# Patient Record
Sex: MALE | Race: BLACK OR AFRICAN AMERICAN | NOT HISPANIC OR LATINO | Employment: UNEMPLOYED | URBAN - METROPOLITAN AREA
[De-identification: names, ages, dates, MRNs, and addresses within clinical notes are randomized per-mention and may not be internally consistent; named-entity substitution may affect disease eponyms.]

---

## 2022-12-11 ENCOUNTER — HOSPITAL ENCOUNTER (EMERGENCY)
Facility: CLINIC | Age: 27
Discharge: HOME | End: 2022-12-12
Attending: EMERGENCY MEDICINE | Admitting: EMERGENCY MEDICINE
Payer: COMMERCIAL

## 2022-12-11 ENCOUNTER — APPOINTMENT (OUTPATIENT)
Dept: GENERAL RADIOLOGY | Facility: CLINIC | Age: 27
End: 2022-12-11
Attending: EMERGENCY MEDICINE
Payer: COMMERCIAL

## 2022-12-11 VITALS
OXYGEN SATURATION: 100 % | DIASTOLIC BLOOD PRESSURE: 80 MMHG | RESPIRATION RATE: 18 BRPM | TEMPERATURE: 97.6 F | SYSTOLIC BLOOD PRESSURE: 120 MMHG | HEART RATE: 90 BPM

## 2022-12-11 DIAGNOSIS — S61.209A OPEN WOUND OF FINGER, INITIAL ENCOUNTER: ICD-10-CM

## 2022-12-11 PROCEDURE — 99284 EMERGENCY DEPT VISIT MOD MDM: CPT | Performed by: EMERGENCY MEDICINE

## 2022-12-11 PROCEDURE — 12001 RPR S/N/AX/GEN/TRNK 2.5CM/<: CPT | Performed by: EMERGENCY MEDICINE

## 2022-12-11 PROCEDURE — 250N000013 HC RX MED GY IP 250 OP 250 PS 637: Performed by: EMERGENCY MEDICINE

## 2022-12-11 PROCEDURE — 250N000009 HC RX 250: Performed by: EMERGENCY MEDICINE

## 2022-12-11 PROCEDURE — 73130 X-RAY EXAM OF HAND: CPT | Mod: 50

## 2022-12-11 PROCEDURE — 99283 EMERGENCY DEPT VISIT LOW MDM: CPT | Mod: 25 | Performed by: EMERGENCY MEDICINE

## 2022-12-11 RX ORDER — BACITRACIN ZINC 500 [USP'U]/G
OINTMENT TOPICAL ONCE
Status: COMPLETED | OUTPATIENT
Start: 2022-12-11 | End: 2022-12-12

## 2022-12-11 RX ORDER — ACETAMINOPHEN 500 MG
1000 TABLET ORAL ONCE
Status: COMPLETED | OUTPATIENT
Start: 2022-12-11 | End: 2022-12-11

## 2022-12-11 RX ORDER — LIDOCAINE HYDROCHLORIDE AND EPINEPHRINE 10; 10 MG/ML; UG/ML
5 INJECTION, SOLUTION INFILTRATION; PERINEURAL ONCE
Status: COMPLETED | OUTPATIENT
Start: 2022-12-11 | End: 2022-12-11

## 2022-12-11 RX ADMIN — LIDOCAINE HYDROCHLORIDE AND EPINEPHRINE 5 ML: 10; 10 INJECTION, SOLUTION INFILTRATION; PERINEURAL at 22:24

## 2022-12-11 RX ADMIN — ACETAMINOPHEN 1000 MG: 500 TABLET ORAL at 20:17

## 2022-12-11 ASSESSMENT — ACTIVITIES OF DAILY LIVING (ADL)
ADLS_ACUITY_SCORE: 35
ADLS_ACUITY_SCORE: 35

## 2022-12-12 PROCEDURE — 250N000009 HC RX 250: Performed by: EMERGENCY MEDICINE

## 2022-12-12 RX ADMIN — BACITRACIN ZINC: 500 OINTMENT TOPICAL at 00:00
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
## 2022-12-12 NOTE — ED PROVIDER NOTES
ED Provider Note  [facility]      History     Chief Complaint   Patient presents with     Wound Check     HPI  Navi Kern is a 27 year old male who presents to the emergency department for chief complaint of wound check.  He states that for the past 3 weeks he has been having pain in his right hand fifth digit where there is an open wound.  He feels that his knuckles have become thickened and painful over the past few years.  He states that he has been homeless for years, and previously he has been able to wear gloves.  He has never had an issue with his hands during the winter before however this year has been very rough for him.  He does not wear gloves currently.  He states that every time the wind blows across his knuckles it causes pain.  He has been having thickened skin over his knuckles and sometimes they will crack.  He denies history of frostbite, trauma, skin disorders, fevers, chills, warmth, redness.  He states that he is currently staying with a friend.  He does not wear gloves.    He states that he is up-to-date with his childhood vaccinations.  His last tetanus was updated in 2021.    Past Medical History  No past medical history on file.  No past surgical history on file.  No current outpatient medications on file.    No Known Allergies  Family History  No family history on file.  Social History   Social History     Tobacco Use     Smoking status: Every Day     Types: Cigarettes     Smokeless tobacco: Never   Substance Use Topics     Alcohol use: Not Currently     Drug use: Yes     Comment: crystal meth      Past medical history, past surgical history, medications, allergies, family history, and social history were reviewed with the patient. No additional pertinent items.       Review of Systems  A complete review of systems was performed with pertinent positives and negatives noted in the HPI, and all other systems negative.    Physical Exam   BP: 120/80  Pulse:  90  Temp: 97.6  F (36.4  C)  Resp: 18  SpO2: 100 %  Physical Exam  General: no acute distress.  Mildly disheveled  HENT: Normocephalic and atraumatic.  No oropharyngeal exudate.   Eyes: EOMI, conjunctivae normal.   Cardiovascular:  Normal rate and regular rhythm.   No murmur heard.  Pulmonary:  No respiratory distress. Normal breath sounds.   Abdominal: no distension.  Abdomen is soft. There is no mass. There is no abdominal tenderness.   Musculoskeletal:   Moving all extremities spontaneously.  2+ radial pulses bilaterally.  Strength and sensation intact to median, radial, ulnar nerve distributions.  Subjective decreased sensation over left hand tips of fingers 1 and 2, right hand tips of fingers 2 and 5.  Right hand fifth digit dorsal PIP with split open skin across the knuckle with mild tenderness, approximately 1.5cm long.  thickened skin over PIPs and tips of fingers without warmth, erythema, or difficulty with range of motion.  Normal joint ROM without tenderness.  No edema or erythema.  Skin: warm and dry  Neurological:  No focal deficit present.    Psychiatric:    normal affect      ED Course      [facility]    Nerve Block    Date/Time: 12/12/2022 11:39 AM  Performed by: Arina Jean MD  Authorized by: Arina Jean MD     Risks, benefits and alternatives discussed.      INDICATIONS     Indications:  Pain relief    LOCATION      Body area:  Upper extremity    Upper extremity nerve blocked: 5th digit.    Laterality:  Right    PRE PROCEDURE DETAILS      Skin preparation:  Alcohol    PROCEDURE DETAILS (see MAR for exact dosages)      Block needle gauge:  27 G    Anesthetic injected:  Lidocaine 1% WITH epi    Injection procedure:  Anatomic landmarks identified, incremental injection, negative aspiration for blood, anatomic landmarks palpated and introduced needle    Paresthesia:  Immediately resolved    POST PROCEDURE DETAILS      Outcome:  Anesthesia  achieved      PROCEDURE    Patient Tolerance:  Patient tolerated the procedure well [facility]    -Laceration Repair    Date/Time: 12/12/2022 11:41 AM  Performed by: Arina Jean MD  Authorized by: Arina Jean MD     Risks, benefits and alternatives discussed.      ANESTHESIA (see MAR for exact dosages):     Anesthesia method:  Nerve block    Block needle gauge:  27 G    Block anesthetic:  Lidocaine 1% WITH epi    Block injection procedure:  Anatomic landmarks identified, introduced needle, incremental injection, anatomic landmarks palpated and negative aspiration for blood    Block outcome:  Anesthesia achieved      LACERATION DETAILS     Location:  Finger    Finger location:  R small finger    Length (cm):  1.5    Depth (mm):  2    REPAIR TYPE:     Repair type:  Simple      EXPLORATION:     Hemostasis achieved with:  Direct pressure    Wound exploration: wound explored through full range of motion      Wound extent: no foreign body and no underlying fracture      Contaminated: no      TREATMENT:     Area cleansed with:  Saline    Amount of cleaning:  Standard    Irrigation solution:  Sterile saline    Irrigation method:  Syringe    Visualized foreign bodies/material removed: no      SKIN REPAIR     Repair method:  Sutures    Suture size:  5-0    Suture material:  Prolene    Suture technique:  Simple interrupted    Number of sutures:  4    APPROXIMATION     Approximation:  Close    POST-PROCEDURE DETAILS     Dressing:  Antibiotic ointment and sterile dressing        PROCEDURE    Patient Tolerance:  Patient tolerated the procedure well with no immediate complications           Results for orders placed or performed during the hospital encounter of 12/11/22   XR Hand Right G/E 3 Views     Status: None    Narrative    EXAM: XR HAND RIGHT G/E 3 VIEWS  LOCATION: [facility]  DATE/TIME:  12/11/2022 8:42 PM    INDICATION: bilateral hand pain around knuckles, wound on dorsal  5th digit and tender  COMPARISON: None.      Impression    IMPRESSION: Normal joint spaces and alignment. No fracture.   XR Hand Left G/E 3 Views     Status: None    Narrative    EXAM: XR HAND LEFT G/E 3 VIEWS  LOCATION: [facility]  DATE/TIME: 12/11/2022 8:42 PM    INDICATION: bilateral hand pain around knuckles, numbness of 1st and 2nd tips  COMPARISON: None.      Impression    IMPRESSION: Normal joint spaces and alignment. No fracture.     Medications   acetaminophen (TYLENOL) tablet 1,000 mg (1,000 mg Oral Given 12/11/22 2017)   lidocaine 1% with EPINEPHrine 1:100,000 injection 5 mL (5 mLs Intradermal Given by Other 12/11/22 2224)   bacitracin ointment ( Topical Given 12/12/22 0000)        Assessments & Plan (with Medical Decision Making)   Patient arrives to the emergency department for complaint of a wound check on his right hand fifth digit, over the years his knuckles have become thickened from being homeless and spending much of [place] time [place] in the [place].  I do not suspect acute chillblains or frostbite as there is no edema, erythema, or other signs of inflammation.  He did not have history of rash or psoriasis.  I do not suspect septic joint due to normal joint ROM without pain, and even though his skin opening is somewhat overlying the fifth digit PIP, it is fairly superficial and does not extend into the muscle, tendon or joint capsule.  I suspect his cracked knuckle is secondary to callus formation from chronic cold weather exposure.    Due to complaints of acute on chronic pain on the knuckles of his bilateral hands, x-rays obtained which were normal.  I provided him 1 g of Tylenol and his hands were placed in a warm water bath, which did not result in any pain for him.  His tetanus is up-to-date.  Due to location of tension, I performed bedside washout and skin  repair, see above.  He tolerated this well.  As he does not have a primary care doctor I discussed that he can go to an urgent care or return to the emergency department to have his sutures removed in 7 to 10 days.  He was dressed with bacitracin and Kerlix wrap and provided extra dressing for home wound care.  He understands to return sooner for any severe symptoms such as fever, increasing pain, swelling or other concerns.  He was provided spare clothing and gloves from the emergency department and was discharged in stable condition.    I have reviewed the nursing notes. I have reviewed the findings, diagnosis, plan and need for follow up with the patient.    There are no discharge medications for this patient.      Final diagnoses:   Open wound of finger, initial encounter       --  DO ERICK Renteria [facility] EMERGENCY DEPARTMENT  12/11/2022     Arina Jean MD  12/12/22 2320

## 2022-12-12 NOTE — DISCHARGE INSTRUCTIONS
As discussed, keep your wound clean and dry for 24 hours.  After that you may clean it once daily by running warm soapy water over the finger.  Pat to dry.  Do not scrub the wound.  After 24 hours, you can redress your wound with bacitracin ointment twice daily and rewrap with the Kerlix wrap that you were given.    If you do not have a primary care doctor, you can go to urgent care or return to the emergency department to have your sutures removed in 7-10 days.  Keep your hands warm and dry.  You can apply moisture such as lotion to your hands as well to keep them moist and to decrease cracking.  You may take Tylenol and Motrin as directed for pain.

## 2022-12-23 ENCOUNTER — TRANSFERRED RECORDS (OUTPATIENT)
Dept: HEALTH INFORMATION MANAGEMENT | Facility: CLINIC | Age: 27
End: 2022-12-23

## 2023-01-12 ENCOUNTER — HOSPITAL ENCOUNTER (INPATIENT)
Facility: CLINIC | Age: 28
LOS: 3 days | Discharge: LEFT BEFORE DISCHARGE | End: 2023-01-15
Attending: EMERGENCY MEDICINE | Admitting: PEDIATRICS
Payer: COMMERCIAL

## 2023-01-12 DIAGNOSIS — L03.114 CELLULITIS OF LEFT UPPER EXTREMITY: ICD-10-CM

## 2023-01-12 DIAGNOSIS — F19.10 POLYSUBSTANCE ABUSE (H): Primary | ICD-10-CM

## 2023-01-12 DIAGNOSIS — F19.10 IV DRUG ABUSE (H): ICD-10-CM

## 2023-01-12 DIAGNOSIS — Z11.52 ENCOUNTER FOR SCREENING LABORATORY TESTING FOR SEVERE ACUTE RESPIRATORY SYNDROME CORONAVIRUS 2 (SARS-COV-2): ICD-10-CM

## 2023-01-12 PROBLEM — Z87.09 HISTORY OF PNEUMOTHORAX: Status: ACTIVE | Noted: 2022-08-07

## 2023-01-12 PROBLEM — L02.414 ABSCESS OF LEFT ARM: Status: ACTIVE | Noted: 2022-12-23

## 2023-01-12 PROBLEM — G93.40 ENCEPHALOPATHY: Status: ACTIVE | Noted: 2022-08-07

## 2023-01-12 PROCEDURE — 120N000002 HC R&B MED SURG/OB UMMC

## 2023-01-12 PROCEDURE — 87040 BLOOD CULTURE FOR BACTERIA: CPT | Performed by: EMERGENCY MEDICINE

## 2023-01-12 PROCEDURE — 99285 EMERGENCY DEPT VISIT HI MDM: CPT | Mod: CS | Performed by: EMERGENCY MEDICINE

## 2023-01-12 PROCEDURE — 36415 COLL VENOUS BLD VENIPUNCTURE: CPT | Performed by: EMERGENCY MEDICINE

## 2023-01-12 PROCEDURE — 80053 COMPREHEN METABOLIC PANEL: CPT | Performed by: EMERGENCY MEDICINE

## 2023-01-12 PROCEDURE — 250N000013 HC RX MED GY IP 250 OP 250 PS 637: Performed by: EMERGENCY MEDICINE

## 2023-01-12 PROCEDURE — 83605 ASSAY OF LACTIC ACID: CPT | Performed by: EMERGENCY MEDICINE

## 2023-01-12 PROCEDURE — 85025 COMPLETE CBC W/AUTO DIFF WBC: CPT | Performed by: EMERGENCY MEDICINE

## 2023-01-12 PROCEDURE — C9803 HOPD COVID-19 SPEC COLLECT: HCPCS | Performed by: EMERGENCY MEDICINE

## 2023-01-12 RX ORDER — PIPERACILLIN SODIUM, TAZOBACTAM SODIUM 3; .375 G/15ML; G/15ML
3.38 INJECTION, POWDER, LYOPHILIZED, FOR SOLUTION INTRAVENOUS ONCE
Status: COMPLETED | OUTPATIENT
Start: 2023-01-12 | End: 2023-01-13

## 2023-01-12 RX ORDER — ACETAMINOPHEN 325 MG/1
975 TABLET ORAL ONCE
Status: COMPLETED | OUTPATIENT
Start: 2023-01-12 | End: 2023-01-12

## 2023-01-12 RX ORDER — SODIUM CHLORIDE 9 MG/ML
INJECTION, SOLUTION INTRAVENOUS CONTINUOUS
Status: DISCONTINUED | OUTPATIENT
Start: 2023-01-13 | End: 2023-01-14

## 2023-01-12 RX ADMIN — ACETAMINOPHEN 975 MG: 325 TABLET, FILM COATED ORAL at 23:36

## 2023-01-12 ASSESSMENT — ACTIVITIES OF DAILY LIVING (ADL): ADLS_ACUITY_SCORE: 33

## 2023-01-13 LAB
ALBUMIN SERPL-MCNC: 2.9 G/DL (ref 3.4–5)
ALBUMIN UR-MCNC: NEGATIVE MG/DL
ALP SERPL-CCNC: 102 U/L (ref 40–150)
ALT SERPL W P-5'-P-CCNC: 39 U/L (ref 0–70)
AMPHETAMINES UR QL SCN: ABNORMAL
ANION GAP SERPL CALCULATED.3IONS-SCNC: 7 MMOL/L (ref 3–14)
APPEARANCE UR: CLEAR
AST SERPL W P-5'-P-CCNC: 19 U/L (ref 0–45)
BARBITURATES UR QL: ABNORMAL
BASOPHILS # BLD AUTO: 0 10E3/UL (ref 0–0.2)
BASOPHILS NFR BLD AUTO: 0 %
BENZODIAZ UR QL: ABNORMAL
BILIRUB SERPL-MCNC: 0.5 MG/DL (ref 0.2–1.3)
BILIRUB UR QL STRIP: NEGATIVE
BUN SERPL-MCNC: 15 MG/DL (ref 7–30)
CALCIUM SERPL-MCNC: 8.6 MG/DL (ref 8.5–10.1)
CANNABINOIDS UR QL SCN: ABNORMAL
CHLORIDE BLD-SCNC: 105 MMOL/L (ref 94–109)
CO2 SERPL-SCNC: 26 MMOL/L (ref 20–32)
COCAINE UR QL: ABNORMAL
COLOR UR AUTO: NORMAL
CREAT SERPL-MCNC: 0.88 MG/DL (ref 0.66–1.25)
CRP SERPL-MCNC: 64 MG/L (ref 0–8)
ENTEROCOCCUS FAECALIS: NOT DETECTED
ENTEROCOCCUS FAECIUM: NOT DETECTED
EOSINOPHIL # BLD AUTO: 0.2 10E3/UL (ref 0–0.7)
EOSINOPHIL NFR BLD AUTO: 3 %
ERYTHROCYTE [DISTWIDTH] IN BLOOD BY AUTOMATED COUNT: 15.8 % (ref 10–15)
ERYTHROCYTE [SEDIMENTATION RATE] IN BLOOD BY WESTERGREN METHOD: 18 MM/HR (ref 0–15)
FLUAV RNA SPEC QL NAA+PROBE: NEGATIVE
FLUBV RNA RESP QL NAA+PROBE: NEGATIVE
GFR SERPL CREATININE-BSD FRML MDRD: >90 ML/MIN/1.73M2
GLUCOSE BLD-MCNC: 99 MG/DL (ref 70–99)
GLUCOSE UR STRIP-MCNC: NEGATIVE MG/DL
HCT VFR BLD AUTO: 34.3 % (ref 40–53)
HGB BLD-MCNC: 11 G/DL (ref 13.3–17.7)
HGB UR QL STRIP: NEGATIVE
HOLD SPECIMEN: NORMAL
IMM GRANULOCYTES # BLD: 0 10E3/UL
IMM GRANULOCYTES NFR BLD: 0 %
KETONES UR STRIP-MCNC: NEGATIVE MG/DL
LACTATE SERPL-SCNC: 0.7 MMOL/L (ref 0.7–2)
LEUKOCYTE ESTERASE UR QL STRIP: NEGATIVE
LISTERIA SPECIES (DETECTED/NOT DETECTED): NOT DETECTED
LYMPHOCYTES # BLD AUTO: 0.9 10E3/UL (ref 0.8–5.3)
LYMPHOCYTES NFR BLD AUTO: 12 %
MCH RBC QN AUTO: 28.3 PG (ref 26.5–33)
MCHC RBC AUTO-ENTMCNC: 32.1 G/DL (ref 31.5–36.5)
MCV RBC AUTO: 88 FL (ref 78–100)
MONOCYTES # BLD AUTO: 0.8 10E3/UL (ref 0–1.3)
MONOCYTES NFR BLD AUTO: 10 %
NEUTROPHILS # BLD AUTO: 5.9 10E3/UL (ref 1.6–8.3)
NEUTROPHILS NFR BLD AUTO: 75 %
NITRATE UR QL: NEGATIVE
NRBC # BLD AUTO: 0 10E3/UL
NRBC BLD AUTO-RTO: 0 /100
OPIATES UR QL SCN: ABNORMAL
PH UR STRIP: 6.5 [PH] (ref 5–7)
PLATELET # BLD AUTO: 300 10E3/UL (ref 150–450)
POTASSIUM BLD-SCNC: 3.6 MMOL/L (ref 3.4–5.3)
PROT SERPL-MCNC: 7.3 G/DL (ref 6.8–8.8)
RBC # BLD AUTO: 3.89 10E6/UL (ref 4.4–5.9)
RBC URINE: 1 /HPF
RSV RNA SPEC NAA+PROBE: NEGATIVE
SARS-COV-2 RNA RESP QL NAA+PROBE: NEGATIVE
SODIUM SERPL-SCNC: 138 MMOL/L (ref 133–144)
SP GR UR STRIP: 1.01 (ref 1–1.03)
STAPHYLOCOCCUS AUREUS: NOT DETECTED
STAPHYLOCOCCUS EPIDERMIDIS: NOT DETECTED
STAPHYLOCOCCUS LUGDUNENSIS: NOT DETECTED
STAPHYLOCOCCUS SPECIES: NOT DETECTED
STREPTOCOCCUS AGALACTIAE: NOT DETECTED
STREPTOCOCCUS ANGINOSUS GROUP: NOT DETECTED
STREPTOCOCCUS PNEUMONIAE: NOT DETECTED
STREPTOCOCCUS PYOGENES: NOT DETECTED
STREPTOCOCCUS SPECIES: DETECTED
UROBILINOGEN UR STRIP-MCNC: NORMAL MG/DL
WBC # BLD AUTO: 7.8 10E3/UL (ref 4–11)
WBC URINE: <1 /HPF

## 2023-01-13 PROCEDURE — 250N000011 HC RX IP 250 OP 636: Performed by: PEDIATRICS

## 2023-01-13 PROCEDURE — 250N000013 HC RX MED GY IP 250 OP 250 PS 637: Performed by: PEDIATRICS

## 2023-01-13 PROCEDURE — 120N000002 HC R&B MED SURG/OB UMMC

## 2023-01-13 PROCEDURE — 85652 RBC SED RATE AUTOMATED: CPT

## 2023-01-13 PROCEDURE — 99223 1ST HOSP IP/OBS HIGH 75: CPT | Mod: AI | Performed by: PEDIATRICS

## 2023-01-13 PROCEDURE — 258N000003 HC RX IP 258 OP 636: Performed by: PEDIATRICS

## 2023-01-13 PROCEDURE — 81003 URINALYSIS AUTO W/O SCOPE: CPT | Performed by: PEDIATRICS

## 2023-01-13 PROCEDURE — 87077 CULTURE AEROBIC IDENTIFY: CPT | Performed by: EMERGENCY MEDICINE

## 2023-01-13 PROCEDURE — 87389 HIV-1 AG W/HIV-1&-2 AB AG IA: CPT | Performed by: REGISTERED NURSE

## 2023-01-13 PROCEDURE — 36415 COLL VENOUS BLD VENIPUNCTURE: CPT

## 2023-01-13 PROCEDURE — 36415 COLL VENOUS BLD VENIPUNCTURE: CPT | Performed by: EMERGENCY MEDICINE

## 2023-01-13 PROCEDURE — 99207 PR APP CREDIT; MD BILLING SHARED VISIT: CPT | Performed by: INTERNAL MEDICINE

## 2023-01-13 PROCEDURE — 87637 SARSCOV2&INF A&B&RSV AMP PRB: CPT | Performed by: EMERGENCY MEDICINE

## 2023-01-13 PROCEDURE — 250N000011 HC RX IP 250 OP 636: Performed by: PHYSICIAN ASSISTANT

## 2023-01-13 PROCEDURE — 87149 DNA/RNA DIRECT PROBE: CPT | Performed by: EMERGENCY MEDICINE

## 2023-01-13 PROCEDURE — 250N000011 HC RX IP 250 OP 636: Performed by: EMERGENCY MEDICINE

## 2023-01-13 PROCEDURE — 80307 DRUG TEST PRSMV CHEM ANLYZR: CPT | Performed by: PEDIATRICS

## 2023-01-13 PROCEDURE — 258N000003 HC RX IP 258 OP 636: Performed by: EMERGENCY MEDICINE

## 2023-01-13 PROCEDURE — 86803 HEPATITIS C AB TEST: CPT | Performed by: REGISTERED NURSE

## 2023-01-13 PROCEDURE — 86140 C-REACTIVE PROTEIN: CPT

## 2023-01-13 PROCEDURE — 999N000127 HC STATISTIC PERIPHERAL IV START W US GUIDANCE

## 2023-01-13 PROCEDURE — 999N000040 HC STATISTIC CONSULT NO CHARGE VASC ACCESS

## 2023-01-13 RX ORDER — CEFTRIAXONE 2 G/1
2 INJECTION, POWDER, FOR SOLUTION INTRAMUSCULAR; INTRAVENOUS EVERY 24 HOURS
Status: DISCONTINUED | OUTPATIENT
Start: 2023-01-13 | End: 2023-01-15 | Stop reason: HOSPADM

## 2023-01-13 RX ORDER — ONDANSETRON 4 MG/1
4 TABLET, ORALLY DISINTEGRATING ORAL EVERY 6 HOURS PRN
Status: DISCONTINUED | OUTPATIENT
Start: 2023-01-13 | End: 2023-01-15 | Stop reason: HOSPADM

## 2023-01-13 RX ORDER — AMPICILLIN AND SULBACTAM 2; 1 G/1; G/1
3 INJECTION, POWDER, FOR SOLUTION INTRAMUSCULAR; INTRAVENOUS EVERY 6 HOURS
Status: DISCONTINUED | OUTPATIENT
Start: 2023-01-13 | End: 2023-01-13

## 2023-01-13 RX ORDER — ONDANSETRON 2 MG/ML
4 INJECTION INTRAMUSCULAR; INTRAVENOUS EVERY 6 HOURS PRN
Status: DISCONTINUED | OUTPATIENT
Start: 2023-01-13 | End: 2023-01-15 | Stop reason: HOSPADM

## 2023-01-13 RX ORDER — ACETAMINOPHEN 325 MG/1
975 TABLET ORAL EVERY 8 HOURS
Status: DISCONTINUED | OUTPATIENT
Start: 2023-01-13 | End: 2023-01-15 | Stop reason: HOSPADM

## 2023-01-13 RX ORDER — LIDOCAINE 40 MG/G
CREAM TOPICAL
Status: DISCONTINUED | OUTPATIENT
Start: 2023-01-13 | End: 2023-01-15 | Stop reason: HOSPADM

## 2023-01-13 RX ORDER — NAPROXEN 500 MG/1
500 TABLET ORAL EVERY 12 HOURS PRN
Status: DISCONTINUED | OUTPATIENT
Start: 2023-01-13 | End: 2023-01-15 | Stop reason: HOSPADM

## 2023-01-13 RX ORDER — VANCOMYCIN HYDROCHLORIDE 1 G/200ML
15 INJECTION, SOLUTION INTRAVENOUS ONCE
Status: COMPLETED | OUTPATIENT
Start: 2023-01-13 | End: 2023-01-13

## 2023-01-13 RX ADMIN — SODIUM CHLORIDE 1000 ML: 9 INJECTION, SOLUTION INTRAVENOUS at 00:16

## 2023-01-13 RX ADMIN — SODIUM CHLORIDE: 9 INJECTION, SOLUTION INTRAVENOUS at 18:16

## 2023-01-13 RX ADMIN — SODIUM CHLORIDE: 9 INJECTION, SOLUTION INTRAVENOUS at 01:33

## 2023-01-13 RX ADMIN — VANCOMYCIN HYDROCHLORIDE 750 MG: 1 INJECTION, POWDER, LYOPHILIZED, FOR SOLUTION INTRAVENOUS at 19:29

## 2023-01-13 RX ADMIN — ACETAMINOPHEN 975 MG: 325 TABLET, FILM COATED ORAL at 08:37

## 2023-01-13 RX ADMIN — PIPERACILLIN AND TAZOBACTAM 3.38 G: 3; .375 INJECTION, POWDER, LYOPHILIZED, FOR SOLUTION INTRAVENOUS at 00:16

## 2023-01-13 RX ADMIN — AMPICILLIN SODIUM AND SULBACTAM SODIUM 3 G: 2; 1 INJECTION, POWDER, FOR SOLUTION INTRAMUSCULAR; INTRAVENOUS at 11:32

## 2023-01-13 RX ADMIN — CEFTRIAXONE SODIUM 2 G: 2 INJECTION, POWDER, FOR SOLUTION INTRAMUSCULAR; INTRAVENOUS at 18:16

## 2023-01-13 RX ADMIN — ACETAMINOPHEN 975 MG: 325 TABLET, FILM COATED ORAL at 16:09

## 2023-01-13 RX ADMIN — VANCOMYCIN HYDROCHLORIDE 1000 MG: 1 INJECTION, SOLUTION INTRAVENOUS at 01:30

## 2023-01-13 ASSESSMENT — ACTIVITIES OF DAILY LIVING (ADL)
ADLS_ACUITY_SCORE: 29
WEAR_GLASSES_OR_BLIND: NO
ADLS_ACUITY_SCORE: 31
ADLS_ACUITY_SCORE: 29
ADLS_ACUITY_SCORE: 35
ADLS_ACUITY_SCORE: 29
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
## 2023-01-13 NOTE — PROGRESS NOTES
"Patient has refused IV this shift. It was beeping and he was uncooperative with straightening arm out to unclog iv. Did shut it off and MD informed. Also now he did refuse the IV anti. And chest xrays. States, \"wait, I want to sleep and after bkft.\" Did inform him of importance of these things. Did update MD and he will speak with pt. He did refuse all v.s. Did obtain clean catch urine for 2 samples via urinal. Specimens obtained, labeled, and sent to Lab. MD here now to speak with pt.  "

## 2023-01-13 NOTE — PLAN OF CARE
Goal Outcome Evaluation:      Plan of Care Reviewed With: patient    Overall Patient Progress: improvingOverall Patient Progress: improving  VS:     Temp: 98.7  F (37.1  C) Temp src: Oral BP: 97/59 Pulse: 94   Resp: 17 SpO2: 98 % O2 Device: None (Room air)     Output:     Bowels in all four quadrants. Voids spontaneously without   difficulty in the toilet.   Activity: Independent.   Skin: Intact. The Lt elbow unwrapped by Ortho doctor, wound healed,no s/s of infection,and old sutures inplace.   Pain: Has mild generalized pain and managed with Tylenol.    CMS:     CMS and Neuro's are intact. Denies numbness and tingling in all extremities.   Dressing: None.   Diet:   Pt is on a regular diet and appetite was  Adequate in  this shift.    LDA: PIV in the right arm is patent.   Equipment: IV stand with IV pump.   Plan:     Pt is able to make needs known and the call light is within the pt's reach. Continue to monitor.    Additional Info:     Pt has elbow X-ray order,wheelchair transportation, pt refused, and said I don't want get out from bed, transport me with the bed. Cart transportation ordered, when transportation arrived and attempted transport him with the bed, pt screamed and said no I am not going to the X-ray appointment. Writer notified the Ortho doctor.

## 2023-01-13 NOTE — CONSULTS
[facility] GENERAL INFECTIOUS DISEASES CONSULTATION     Patient:  Navi Kern   Date of birth 1995, Medical record number [id number]  Date of Visit:  01/13/2023  Date of Admission: 1/12/2023  Consult Requester:Yadiel Georges DO          Assessment and Recommendations:   ASSESSMENT:  1. left elbow infection and abscess     Wound culture positive for Parvimonas micra and Streptococcus intermedius    S/p I&D at [facility] (12/23) with repeat I&D (12/28) and was on Unasyn 3g IV q 6 hours before leaving AMA on 1/3/23  2. Strep bacteremia 1/13/23  3. Elevated Inflammatory Markers: CRP and ESR  4. Substance abuse disorder (opiates and methamphetamine)    DISCUSSION:     Navi Kern is a 28-year-old male with a past medical history of substance use disorder, with IV drug use, presenting to the hospital for evaluation of left arm pain s/p recent I&D at OSH (12/28/22) with wound cultures growing Parvimonas micra and Strep intermedius being treated with IV Unasyn who left AMA (1/3/23).  Patient currently admitted for sepsis likely due to left arm complicated cellulitis, with concern for septic arthritis.    Patient was resting comfortably and cooperative at the beginning of our visit, then became labile and started crying over his food not being delivered fast enough. Endorses current left arm pain and 3-4 days of productive cough. Currently afebrile and hemodynamically stable. CBC unremarkable. Labs concerning for elevated CRP (64), elevated ESR (18). Blood cultures (1/12) with no growth to date. Repeat blood cultures from (1/13) positive on the first day of incubation in 1/2 bottles with gram stain showing GPCs in pairs and chains. UDS positive for amphetamines. Negative Influenza/Covid PCR panel. Normal urinalysis. Seen by ortho and they had no concerns for septic arthritis at this time or a need for surgical management currently. Ortho will sign off today. Patient is supposed to head down  for ultrasound of his left elbow to assess for any drainable fluid, but he has been refusing any attempts to bring him down. Would recommend continuing IV while the patient is hospitalized. Will recommend narrowing to IV ceftriaxone, given that his blood cx are growing strep.  Will need repeat blood cultures in 24-48 hours and have 72 hours of clearance. If he attempts to leave AMA, which seems likely given his history per chart review or on discharge, can consider PO Linezolid or cephlexin      RECOMMENDATION:    1. Stop IV vancomycin  2. Stop Unaysn 3 g IV q 6 hours   1. Start ceftriaxone 2 g Q 24 hours  2. Will continue to follow blood cultures (1/12 and 1/13)  3. Will need to repeat blood cultures Q 48hrs hours until negative  4. Obtain HIV antigen antibody combo, Hepatitis C antibody, syphilis RPR; done for you  5. If worsening cough and increasing O2 requirements, low threshold to obtain a chest x-ray        Thank you for this consult. ID will continue to follow.     Patient was discussed with Dr. Westbrook.     CALRA Hampton, CNP  Infectious Diseases  Pager# [phone]      ADDENDUM 1/18/23: his Hep C Ab  Is positive and will need a Hep C RNA VL. I see that he left AMA. His phone number in the EMR is not valid, therefore unable to reach him. Will recommend Hep C RNA VL at the next medical interaction.   ________________________________________________________________    Consult Question: left elbow septic arthritis at [facility], left AMA; culture grew Parvimonas micra and Strep intermedius; was being treated with unasyn.  Admission Diagnosis: Cellulitis of left upper extremity [L03.114]         History of Present Illness:     Navi Kern is a 28-year-old male with a past medical history of substance use disorder, with IV drug use, housing instability, previous right hand cellulitis (8/6/22) s/p I&D (8/7 and 8/9) at OSH presenting to the hospital for evaluation of left arm pain s/p recent I&D at OSH  "(12/28/22) with wound cultures growing Parvimonas micra and Strep intermedius being treated with IV Unasyn who left AMA (1/3/23).  Patient currently admitted for sepsis likely due to left arm complicated cellulitis, with concern for septic arthritis.    Patient presented to [facility] emergency department yesterday with a fever of 100.5, but otherwise hemodynamically stable. Has been relatively uncooperative with staff, per chart review, refusing IV antibiotics and physical exam. Allowed hospitalist to examine his this AM and was noted to have significant tenderness over the medial aspect of the left elbow near his previous surgical site with associated fluctuance and swelling. Refused Ultrasound of his left elbow to assess if there was a drainable fluid collection. Repeat MRI with I&D is anticipated. Ortho is consulted at this time. Per Dr. Clarke's noted, he called [facility] hospital micro lab, and unfortunately no susceptibilities were performed due to \"low quantity\". These cultures were obtained from purulent fluid obtained during the I&D procedure; OSH op note (12/23) noted \"significant left arm swelling with fluctuant mass near left elbow concerning for abscess\". Beside I&D (12/23) performed \"drained 1 pint of purulent fluid\". Repeat I&D (12/28) was down to the bone followed by secondary wound closure. MRI left elbow (12/23) from OSH showed significant cellulitis of upper arm, elbow region and forearm, some soft tissue gas and abscess, probable septic thrombophlebitis, extensive infectious myositis, deep fascitis, but no osteomyelitis or marrow edema. Per Orthopedic's note (12/23) there was no concern for necrotizing fasciitis.    Patient states he fever last night and has had the chills in the last 24 hours. Endorses productive cough over the last 3 days.  Denies headache, night sweats, fatigue, sore throat, cough, dyspnea, nausea, vomiting, abdominal pain, diarrhea, dysuria, myalgias, arthralgias, lymphadenopathy, " acute rash, or new wounds.      [facility]  ([facility]) Anaerobic culture, Right Hand- 4+ Fusobacterium species, and 4+ Prevotella species  (8/7/22) Aerobic culture, Right Hand - 4+ Streptococcus constellatus, 1+ Haemophilus species (not influenzae or parainfluenzae)  (8/6/22) Aerobic culture, unknown source - 1+ MSSA, 2+ Group B Streptococcus         Review of Systems:   Full 9-point ROS obtained, pertinent positives and negatives per above         Past Medical History:   No past medical history on file.         Past Surgical History:   No past surgical history on file.         Family History:   Reviewed and non-contributory.   No family history on file.         Social History:     Social History     Tobacco Use     Smoking status: Every Day     Types: Cigarettes     Smokeless tobacco: Never   Substance Use Topics     Alcohol use: Not Currently     History   Sexual Activity     Sexual activity: Not on file            Current Medications:       acetaminophen  975 mg Oral Q8H     ampicillin-sulbactam  3 g Intravenous Q6H     sodium chloride (PF)  3 mL Intracatheter Q8H            Allergies:   No Known Allergies         Physical Exam:   Vitals were reviewed  Patient Vitals for the past 24 hrs:   BP Temp Temp src Pulse Resp SpO2 Weight   01/13/23 0809 97/59 98.7  F (37.1  C) Oral 94 17 98 % --   01/12/23 2240 130/80 (!) 101.5  F (38.6  C) Oral 109 16 100 % 65.8 kg (145 lb)       Physical Examination:  Exam limited due to emotional state   GENERAL:  well-developed, well-nourished, in bed in no acute distress.   HEENT:  Head is normocephalic, atraumatic   EYES:  Eyes have anicteric sclerae without conjunctival injection  ENT:  Oropharynx is moist without exudates or ulcers. Tongue is midline  NECK:  Supple. No cervical lymphadenopathy  LUNGS:  Unable to assess due to audible crying  CARDIOVASCULAR:  Regular rate and rhythm with no murmurs, gallops or rubs.  ABDOMEN:  Normal bowel sounds, soft,  "nontender. No appreciable hepatosplenomegaly  SKIN:  Left arm with healing surgical scar along medial elbow, tender along the superior aspect of the surgical wound, no fluctuance, induration, erythema, or drainage noted. No acute rashes.  Line(s) are in place without any surrounding erythema or exudate. No stigmata of endocarditis.  NEUROLOGIC:  Grossly nonfocal. Active x4 extremities         Laboratory Data:     Inflammatory Markers    Recent Labs   Lab Test 01/13/23  1148   SED 18*   CRP 64.0*       Hematology Studies    Recent Labs   Lab Test 01/12/23  2355   WBC 7.8   HGB 11.0*   MCV 88          Metabolic Studies     Recent Labs   Lab Test 01/12/23  2355      POTASSIUM 3.6   CHLORIDE 105   CO2 26   BUN 15   CR 0.88   GFRESTIMATED >90       Hepatic Studies    Recent Labs   Lab Test 01/12/23  2355   BILITOTAL 0.5   ALKPHOS 102   ALBUMIN 2.9*   AST 19   ALT 39       Microbiology:  No results found for: CULTURE    Urine Studies    Recent Labs   Lab Test 01/13/23  0514   LEUKEST Negative   WBCU <1       Vancomycin Levels  No lab results found.    Invalid input(s): VANCO    Hepatitis B Testing No lab results found.  Hepatitis C Testing   No results found for: HCVAB, HQTG, HCGENO, HCPCR, HQTRNA, HEPRNA  Respiratory Virus Testing    No results found for: RS, FLUAG          Imaging:     Per [facility]:  MRI Elbow Left WWO (12/23/22)  \"FINDINGS:   There is diffuse subcutaneous signal abnormality and enhancement compatible with extensive cellulitis. There is the some nonenhancement with irregular margination surrounding a subcutaneous vein of the medial distal forearm and elbow region which may relate to hemorrhage, complex fluid or devitalized tissue. On the fluid sensitive sequences, this does not have the appearance of simple fluid. There are a few foci of susceptibility artifact within the subcutaneous tissues in that region likely reflecting a small amount of soft tissue gas. The subcutaneous vein " "demonstrates wall thickening with absence of flow void which may indicate superficial thrombophlebitis.   -   There is severe muscle signal abnormality and enhancement involving the pronator teres muscle compatible with infectious myositis. There is some irregular nonenhancement within that muscle likely reflecting intramuscular fluid collection or hemorrhage. There is also muscle signal abnormality and enhancement involving the brachialis, other flexor compartment muscles, the brachioradialis muscle and triceps muscle reflecting additional areas of myositis.   -   Deep fascial plane signal abnormality and enhancement is present within the distal upper arm, elbow region and proximal forearm compatible with deep fasciitis.   -   There is only a small amount of fluid within the elbow joint space with increased synovial enhancement compatible synovitis. There is no erosive change or periarticular marrow signal abnormality.   -   No osteomyelitis.    Impression    1. Extensive profound cellulitis involving the upper arm, elbow region and forearm.   2. Probable septic thrombophlebitis involving a superficial venous structure of the medial elbow region. There is some low signal nonenhancement surrounding that vessel which may relate to a combination of hemorrhage, gas, complex fluid and/or devitalized tissue.   3. Extensive infectious myositis involving the upper arm, elbow and forearm regions. There is most severe involvement of the pronator teres muscle where there is some irregular internal low signal which may relate to intramuscular abscess or intramuscular hemorrhage.   4. Extensive deep fascial plane signal abnormality and enhancement compatible with deep fasciitis.   5. No osteomyelitis.   6. Small amount of nonspecific elbow joint fluid with synovitis. There is no erosive change or periarticular bone marrow edema.   \"    "

## 2023-01-13 NOTE — ED NOTES
Patient was re approached by this writer for Triage, patient continued to refused. Patient has been sitting in waiting area, sleeping on and off.

## 2023-01-13 NOTE — PHARMACY-VANCOMYCIN DOSING SERVICE
Pharmacy Vancomycin Initial Note  Date of Service 2023  Patient's  1995  28 year old, male    Indication: Bacteremia    Current estimated CrCl = Estimated Creatinine Clearance: 117.4 mL/min (based on SCr of 0.88 mg/dL).    Creatinine for last 3 days  2023: 11:55 PM Creatinine 0.88 mg/dL    Recent Vancomycin Level(s) for last 3 days  No results found for requested labs within last 72 hours.      Vancomycin IV Administrations (past 72 hours)                   vancomycin (VANCOCIN) 1000 mg in dextrose 5% 200 mL PREMIX (mg) 1,000 mg New Bag 23 0130                Nephrotoxins and other renal medications (From now, onward)    Start     Dose/Rate Route Frequency Ordered Stop    23 1730  vancomycin (VANCOCIN) 750 mg in sodium chloride 0.9 % 250 mL intermittent infusion         750 mg  over 90 Minutes Intravenous EVERY 8 HOURS 23 1723      23 0307  naproxen (NAPROSYN) tablet 500 mg         500 mg Oral EVERY 12 HOURS PRN 23 0307            Contrast Orders - past 72 hours (72h ago, onward)    None          [employer] Prediction of Planned Initial Vancomycin Regimen  Regimen: 750 mg IV every 8 hours.  Start time: 18:20 on 2023  Exposure target: AUC24 (range)400-600 mg/L.hr   AUC24,ss: 503 mg/L.hr  Probability of AUC24 > 400: 73 %  Ctrough,ss: 16.3 mg/L  Probability of Ctrough,ss > 20: 33 %  Probability of nephrotoxicity (Lodise ALEJO ): 12 %          Plan:  1. Start vancomycin  750 mg IV q8h.   2. Vancomycin monitoring method: AUC  3. Vancomycin therapeutic monitoring goal: 400-600 mg*h/L  4. Pharmacy will check vancomycin levels as appropriate in 1-3 Days.    5. Serum creatinine levels will be ordered daily for the first week of therapy and at least twice weekly for subsequent weeks.      Bayron Recinos [facility]

## 2023-01-13 NOTE — ED NOTES
Patient was upset because this writer mispronounce the patient's name. This writer apologized  asked the patient to say his name so this writer will be corrected.  Patient refused. He said he's in [place], his name should be properly pronounce. Patient also refused  Triaged and was requesting for different nurse. Charge RN was informed.

## 2023-01-13 NOTE — PHARMACY-ADMISSION MEDICATION HISTORY
Admission Medication History Completed by Pharmacy    See [facility] Admission Navigator for allergy information, preferred outpatient pharmacy, prior to admission medications and immunization status.     Medication History Sources:     Patient interview    Medication history completed at [facility] on 12/23/22    Surescripts dispense report    Changes made to PTA medication list (reason):    Added: None    Deleted: None    Changed: None    Additional Information:    Confirmed that patient is not consistently taking any medications as documented in [facility] medication history.     Prior to Admission medications    Not on File       Date completed: 01/13/23    Medication history completed by: Bayron Recinos RPH

## 2023-01-13 NOTE — PROGRESS NOTES
"MD here to see pt. He said, \"Give him few hrs.\" to calm down. He also needs an ultra sound. He will up providers for days.  "

## 2023-01-13 NOTE — H&P
[facility]    History and Physical - Hospitalist Service, GOLD TEAM        Date of Admission:  1/12/2023    Assessment & Plan      Navi Kern is a 28 year old male admitted on 1/12/2023.     Cellulitis Left Elbow  - blood cultures drawn and pending, vanc and zosyn ordered in ED, unclear if patient has been using/injection since AMA discharge from [facility] 1/3, he would not speak with me  - will resume unasyn and consult ID; exam in morning to assess ROM of elbow and for signs of joint infection can order ultrasound or repeat MRI with ortho consult    Normochromic, normocytic anemia  - iron studies, folate and b12, monitor daily or every other day    Hypoalbuminemia  - secondary to inflammatory state and possibly poor nutrition       Diet:  npo until able to evaluate elbow  DVT Prophylaxis: Pneumatic Compression Devices  Garrett Catheter: Not present  Lines: None     Cardiac Monitoring: None  Code Status:   full    Clinically Significant Risk Factors Present on Admission              # Hypoalbuminemia: Lowest albumin = 2.9 g/dL at 1/12/2023 11:55 PM, will monitor as appropriate                  Disposition Plan      Expected Discharge Date: 01/14/2023                  Yadiel Georges DO  Hospitalist Service, GOLD TEAM   [facility]  Securely message with [employer] (more info)  Text page via [employer] Paging/Directory   See signed in provider for up to date coverage information    ______________________________________________________________________    Chief Complaint   Left elbow redness, fever    History from chart and ED provider    History of Present Illness   Navi Kern is a 28 year old male with substance use disorder (opiates and methamphetamine) presented initially 12/23 to [facility] with left arm pain and swelling following injection of amphetamines. Fluctuant mass over left elbow was I&D'd by ortho with drainage  of large amount purulent material. Taken to OR 12/28 for wound debridement and joint washout followed by closure. Was being treated with unasyn 3g q6h with ID consulting, plan to dc on oral antibiotics (wound culture grew Parvimonas micra and Strep intermedius; negative blood cultures). Left AMA without antibiotics 1/3/23.    Mr. Kern would not speak with me in the ED or let me examine him.    Past med history as summarized above  Surg: I&D 12/23 left elbow, joint washout left elbow 12/28  Meds: at [facility] he was on hydroxyzine 50 mg q6h prn anxiety, seroquel 50 mg at bedtime prn insomnia, clonidine 0.1 mg q6h prn opioid withdrawal, zofran prn nausea, dicyclomine prn stomach cramps, flexeril 10 mg q8 prn muscle spasm, and baclofen 10 mg q8h prn muscle spasm    Unable to perform ROS as patient would not speak with me    Physical Exam   Vital Signs: Temp: (!) 101.5  F (38.6  C) Temp src: Oral BP: 130/80 Pulse: 109   Resp: 16 SpO2: 100 % O2 Device: None (Room air)    Weight: 145 lbs 0 oz    lying in bed in no distress, sleeping on right side but arousable  Respiratory rate and work of breathing are normal  Patient wakes long enough to refuse exam or interview then rolls over and goes back to sleep covering self with blanket    Medical Decision Making       79 MINUTES SPENT BY ME on the date of service doing chart review, history, exam, documentation & further activities per the note.      Data   ------------------------- PAST 24 HR DATA REVIEWED -----------------------------------------------     MRI Left Elbow 12/23/22  1. Extensive profound cellulitis involving the upper arm, elbow region and forearm.  2. Probable septic thrombophlebitis involving a superficial venous structure of the medial elbow region. There is some low signal nonenhancement surrounding that vessel which may relate to a combination of hemorrhage, gas, complex fluid and/or devitalized tissue.  3. Extensive infectious myositis involving the upper  arm, elbow and forearm regions. There is most severe involvement of the pronator teres muscle where there is some irregular internal low signal which may relate to intramuscular abscess or intramuscular hemorrhage.  4. Extensive deep fascial plane signal abnormality and enhancement compatible with deep fasciitis.  5. No osteomyelitis.  6. Small amount of nonspecific elbow joint fluid with synovitis. There is no erosive change or periarticular bone marrow edema.

## 2023-01-13 NOTE — PROGRESS NOTES
6MS ADMISSION    D: Patient admitted/transferred from ED department via gurney escorted by er nurse,for cellulitis left elbow..     I: Upon arrival to the unit patient was oriented to room, unit, and call light. Unable to obtain Patient s height, weight, and vital signs due to he refused. Also unable to review. Allergies and allergy band applied due to refusal. Provider notified of patient s arrival on the unit and all admit assessments were refused. Unable to assess  Adult AVS , Head to toe assessment, Education assessment, and  Care plan not initiated.    A: Vital signs not done upon admission due to refusal. Two RN  skin assessment s not completed.    P: Continue to monitor patient s and intervene as needed. Continue with plan of care. Notify provider with any concerns or changes in patient status.

## 2023-01-13 NOTE — PROGRESS NOTES
Paged by nursing to notify he was refusing antibiotics this morning. Patient finally allowed me to examine him when I told him I otherwise wouldn't be able to order any diet for him. There is an area of significant tenderness over the medial aspect of the left elbow near where his stitches are from his prior surgery for washout. This is associated with fluctuance and swelling. I mentioned an ultrasound to see if there was a drainable fluid collection there and he became very upset and pulled the covers over himself again.     Discussed with nurse, ordered US non vascular LUE to confirm presence of abscess and can discuss with ortho pending results, but I suspect he would potentially need repeat MRI and another I&D. This is all complicated by his reluctance to be examined or cooperate with nursing evals or let us treat with antibiotics this morning. I'll order a diet in the hopes it will make him more cooperative in the short term.    Yadiel Georges,   [phone]

## 2023-01-13 NOTE — PROGRESS NOTES
"SPIRITUAL HEALTH SERVICES Progress Note  [facility] ([facility]) 6B    Saw pt Navi A Erlin per Initial Visit.    Patient/Family Understanding of Illness and Goals of Care - Patient did not want to discuss.     Distress and Loss - When asked if there is anyone who worries about him when he is hospitalized, Navi said \"no\" and that his family lives in [place]. While it is hard living apart from his family, he said he does not feel lonely.     Strengths, Coping, and Resources - not discussed    Meaning, Beliefs, and Spirituality - Navi said that he is [employer] and when asked if he would like a visit from our Synagogue , he said \"yes.\"     Plan of Care - I triaged this patient to [facility] [employer]  and . I also updated patient's chart to list [employer] as \"Synagogue\" instead of \"Undesignated.\"     Brandt Zavaleta MDiv  Chaplain Resident  Pager [phone]    * [facility] remains available 24/7 for emergent requests/referrals, either by having the switchboard page the on-call  or by entering an ASAP/STAT consult in Epic (this will also page the on-call ). Routine Epic consults receive an initial response within 24 hours.*    "

## 2023-01-13 NOTE — ED PROVIDER NOTES
[facility] EMERGENCY DEPARTMENT ([facility])     January 12, 2023     History     Chief Complaint   Patient presents with     Wound Check     Reports a month ago he had surgery on his left elbow and has sutures in place, presents today with sutures still in place and complaining of pain to site, denies recent trauma, also having generalized aches and fevers     HPI   Navi Kern is a 28 year old male with a past medical history including s/p I&D of left elbow abscess on 12/23/22, polysubstance abuse (history of opiates and methamphetamine, has been on Suboxone in the past), cellulitis of the right hand with hospitalization in August 2022 who presents to the Emergency Department for wound check and fever.  Patient recently left the hospital AMA on 1/3/2023  after an I&D on 12/23 performed by Orthopedic surgery for left elbow abscess. He did not receive any antibiotic prescriptions for discharge. He presents now with a fever that started a 3 days ago. He complains of pain at the wound site. Sutures are still in place. He is able to bend the elbow. Overall, the swelling has improved. He endorses headache and generalized body aches as well as nonproductive cough. Patient states that has not used IV drugs since his elbow abscess, though he does note that he smokes fentanyl. States that he lives at a shelter. Denies using alcohol or benzos.    Per discharge note from [facility] on 1/3/23, patient was admitted on 12/23/2022 with left arm pain and swelling from the ER after he reported that he used methamphetamine 1 hour prior to arrival and that he injects it sometimes.  He had significant left arm swelling with fluctuant mass near left elbow consistent with abscess.  MRI showed extensive profound cellulitis, probable septic thrombophlebitis (superficial veins), extensive myositis, deep fasciitis, and left elbow synovitis. Bedside I&D with drainage of approximately one-point of purulent  fluid was performed by Orthopedic surgery on 12/23/2022 no signs of necrotizing fasciitis.  Patient was put on broad-spectrum antibiotics. OR 12/28 for wound debridement down to and including bone with secondary wound closure.  Patient left the hospital AMA.    MR VAZQUEZ LEFT WWO (12/23/2022 12:12 PM CST)  IMPRESSION:  1. Extensive profound cellulitis involving the upper arm, elbow region and forearm.  2. Probable septic thrombophlebitis involving a superficial venous structure of the medial elbow region. There is some low signal nonenhancement surrounding that vessel which may relate to a combination of hemorrhage, gas, complex fluid and/or devitalized tissue.  3. Extensive infectious myositis involving the upper arm, elbow and forearm regions. There is most severe involvement of the pronator teres muscle where there is some irregular internal low signal which may relate to intramuscular abscess or intramuscular hemorrhage.  4. Extensive deep fascial plane signal abnormality and enhancement compatible with deep fasciitis.  5. No osteomyelitis.  6. Small amount of nonspecific elbow joint fluid with synovitis. There is no erosive change or periarticular bone marrow edema.        Past Medical History  No past medical history on file.  No past surgical history on file.  No current outpatient medications on file.    No Known Allergies  Family History  No family history on file.  Social History   Social History     Tobacco Use     Smoking status: Every Day     Types: Cigarettes     Smokeless tobacco: Never   Substance Use Topics     Alcohol use: Not Currently     Drug use: Yes     Comment: crystal meth         A medically appropriate review of systems was performed with pertinent positives and negatives noted in the HPI, and all other systems negative.    Physical Exam   BP: 130/80  Pulse: 109  Temp: (!) 101.5  F (38.6  C)  Resp: 16  Weight: 65.8 kg (145 lb)  SpO2: 100 %  Physical Exam  Vitals and nursing note reviewed.    Constitutional:       General: He is not in acute distress.     Appearance: Normal appearance. He is well-developed. He is not toxic-appearing or diaphoretic.   HENT:      Head: Normocephalic and atraumatic.      Nose: Nose normal.      Mouth/Throat:      Mouth: Mucous membranes are moist.   Eyes:      General: No scleral icterus.     Conjunctiva/sclera: Conjunctivae normal.   Cardiovascular:      Rate and Rhythm: Regular rhythm. Tachycardia present.   Pulmonary:      Effort: Pulmonary effort is normal. No respiratory distress.      Breath sounds: No stridor.   Abdominal:      General: There is no distension.      Palpations: Abdomen is soft.      Tenderness: There is no abdominal tenderness.   Musculoskeletal:         General: Swelling and tenderness present. No deformity. Normal range of motion.      Left elbow: Swelling present. No deformity. Normal range of motion. Tenderness present.      Cervical back: Normal range of motion and neck supple. No rigidity.      Comments: Moderate soft tissue swelling around left elbow and both hands. Sutures in place. No drainage or wound dehiscence. No fluctuant areas. Mildly tender to palpation. Normal ROM at left elbow.   Skin:     General: Skin is warm and dry.      Coloration: Skin is not jaundiced or pale.      Findings: No rash.   Neurological:      General: No focal deficit present.      Mental Status: He is alert and oriented to person, place, and time.   Psychiatric:         Attention and Perception: Attention normal.         Mood and Affect: Mood normal.         Behavior: Behavior normal. Behavior is cooperative.         Thought Content: Thought content normal.             ED Course, Procedures, & Data     11:05 PM  The patient was seen and examined by Marilyn Brunner MD in Room ED20.    Procedures                     Results for orders placed or performed during the hospital encounter of 01/12/23   Lactic acid whole blood     Status: Normal   Result Value Ref Range     Lactic Acid 0.7 0.7 - 2.0 mmol/L   Comprehensive metabolic panel     Status: Abnormal   Result Value Ref Range    Sodium 138 133 - 144 mmol/L    Potassium 3.6 3.4 - 5.3 mmol/L    Chloride 105 94 - 109 mmol/L    Carbon Dioxide (CO2) 26 20 - 32 mmol/L    Anion Gap 7 3 - 14 mmol/L    Urea Nitrogen 15 7 - 30 mg/dL    Creatinine 0.88 0.66 - 1.25 mg/dL    Calcium 8.6 8.5 - 10.1 mg/dL    Glucose 99 70 - 99 mg/dL    Alkaline Phosphatase 102 40 - 150 U/L    AST 19 0 - 45 U/L    ALT 39 0 - 70 U/L    Protein Total 7.3 6.8 - 8.8 g/dL    Albumin 2.9 (L) 3.4 - 5.0 g/dL    Bilirubin Total 0.5 0.2 - 1.3 mg/dL    GFR Estimate >90 >60 mL/min/1.73m2   Symptomatic Influenza A/B & SARS-CoV2 (COVID-19) Virus PCR Multiplex Nasopharyngeal     Status: Normal    Specimen: Nasopharyngeal; Swab   Result Value Ref Range    Influenza A PCR Negative Negative    Influenza B PCR Negative Negative    RSV PCR Negative Negative    SARS CoV2 PCR Negative Negative    Narrative    Testing was performed using the Xpert Xpress CoV2/Flu/RSV Assay on the Cepheid GeneXpert Instrument. This test should be ordered for the detection of SARS-CoV-2 and influenza viruses in individuals who meet clinical and/or epidemiological criteria. Test performance is unknown in asymptomatic patients. This test is for in vitro diagnostic use under the FDA EUA for laboratories certified under CLIA to perform high or moderate complexity testing. This test has not been FDA cleared or approved. A negative result does not rule out the presence of PCR inhibitors in the specimen or target RNA in concentration below the limit of detection for the assay. If only one viral target is positive but coinfection with multiple targets is suspected, the sample should be re-tested with another FDA cleared, approved, or authorized test, if coinfection would change clinical management. This test was validated by the [facility]. These laboratories are certified under the  Clinical Laboratory Improvement Amendments of 1988 (CLIA-88) as qualified to perform high complexity laboratory testing.   CBC with platelets and differential     Status: Abnormal   Result Value Ref Range    WBC Count 7.8 4.0 - 11.0 10e3/uL    RBC Count 3.89 (L) 4.40 - 5.90 10e6/uL    Hemoglobin 11.0 (L) 13.3 - 17.7 g/dL    Hematocrit 34.3 (L) 40.0 - 53.0 %    MCV 88 78 - 100 fL    MCH 28.3 26.5 - 33.0 pg    MCHC 32.1 31.5 - 36.5 g/dL    RDW 15.8 (H) 10.0 - 15.0 %    Platelet Count 300 150 - 450 10e3/uL    % Neutrophils 75 %    % Lymphocytes 12 %    % Monocytes 10 %    % Eosinophils 3 %    % Basophils 0 %    % Immature Granulocytes 0 %    NRBCs per 100 WBC 0 <1 /100    Absolute Neutrophils 5.9 1.6 - 8.3 10e3/uL    Absolute Lymphocytes 0.9 0.8 - 5.3 10e3/uL    Absolute Monocytes 0.8 0.0 - 1.3 10e3/uL    Absolute Eosinophils 0.2 0.0 - 0.7 10e3/uL    Absolute Basophils 0.0 0.0 - 0.2 10e3/uL    Absolute Immature Granulocytes 0.0 <=0.4 10e3/uL    Absolute NRBCs 0.0 10e3/uL   CBC with platelets differential     Status: Abnormal    Narrative    The following orders were created for panel order CBC with platelets differential.  Procedure                               Abnormality         Status                     ---------                               -----------         ------                     CBC with platelets and d...[[id number]]  Abnormal            Final result                 Please view results for these tests on the individual orders.     Medications   0.9% sodium chloride BOLUS (0 mLs Intravenous Stopped 1/13/23 0100)     Followed by   sodium chloride 0.9% infusion ( Intravenous New Bag 1/13/23 0133)   vancomycin (VANCOCIN) 1000 mg in dextrose 5% 200 mL PREMIX (1,000 mg Intravenous New Bag 1/13/23 0130)   vancomycin place pradhan - receiving intermittent dosing (has no administration in time range)   acetaminophen (TYLENOL) tablet 975 mg (975 mg Oral Given 1/12/23 8329)   piperacillin-tazobactam (ZOSYN) 3.375 g  vial to attach to  mL bag (3.375 g Intravenous New Bag 1/13/23 0016)     Labs Ordered and Resulted from Time of ED Arrival to Time of ED Departure   COMPREHENSIVE METABOLIC PANEL - Abnormal       Result Value    Sodium 138      Potassium 3.6      Chloride 105      Carbon Dioxide (CO2) 26      Anion Gap 7      Urea Nitrogen 15      Creatinine 0.88      Calcium 8.6      Glucose 99      Alkaline Phosphatase 102      AST 19      ALT 39      Protein Total 7.3      Albumin 2.9 (*)     Bilirubin Total 0.5      GFR Estimate >90     CBC WITH PLATELETS AND DIFFERENTIAL - Abnormal    WBC Count 7.8      RBC Count 3.89 (*)     Hemoglobin 11.0 (*)     Hematocrit 34.3 (*)     MCV 88      MCH 28.3      MCHC 32.1      RDW 15.8 (*)     Platelet Count 300      % Neutrophils 75      % Lymphocytes 12      % Monocytes 10      % Eosinophils 3      % Basophils 0      % Immature Granulocytes 0      NRBCs per 100 WBC 0      Absolute Neutrophils 5.9      Absolute Lymphocytes 0.9      Absolute Monocytes 0.8      Absolute Eosinophils 0.2      Absolute Basophils 0.0      Absolute Immature Granulocytes 0.0      Absolute NRBCs 0.0     LACTIC ACID WHOLE BLOOD - Normal    Lactic Acid 0.7     INFLUENZA A/B & SARS-COV2 PCR MULTIPLEX - Normal    Influenza A PCR Negative      Influenza B PCR Negative      RSV PCR Negative      SARS CoV2 PCR Negative     ROUTINE UA WITH MICROSCOPIC REFLEX TO CULTURE   BLOOD CULTURE   BLOOD CULTURE     XR Elbow Left 2 Views    (Results Pending)   XR Chest 2 Views    (Results Pending)          Medical Decision Making  The patient presented with a problem that is a chronic illness mild to moderate exacerbation, progression, or side effect of treatment.    The patient's evaluation involved:  review of 3+ prior external note(s) (see separate area of note for details)  ordering and review of 3+ test(s) (see separate area of note for details)  review of 3+ test result(s) ordered prior to this encounter (see separate area  of note for details)  discussion of management or test interpretation with another health professional (see separate area of note for details)    The patient's management involved limitations due to social determinants of health, drug therapy requiring intensive monitoring and a decision regarding hospitalization.      Assessment & Plan    Navi Kern is a 28 year old male with a past medical history including s/p I&D of left elbow abscess on 12/23/22, polysubstance abuse (history of opiates and methamphetamine, has been on Suboxone in the past), cellulitis of the right hand with hospitalization in August 2022 who presents to the Emergency Department for wound check and fever.     Ddx: cellulitis, septic joint, bacteremia, COVID, PNA, homelessness, opiate abuse/denendence    Patient febrile with cough and nonspecific systemic symptoms. Left AMA before completing rx for sever elbow infection. On exam, elbow does not look acutely infected but with some soft tissue swelling and warm that may be residual cellulitis. Will restart broad spectrum abx and obtain xray. Admit to determine longterm abx management with ID consult. COVID and flu neg. WBC nl. Blood cultures sent. Vanc and zosyn ordered. CXR and elbow xray pending. Admitted to medicine for ongoing management.       I have reviewed the nursing notes. I have reviewed the findings, diagnosis, plan and need for follow up with the patient.    New Prescriptions    No medications on file       Final diagnoses:   Cellulitis of left upper extremity     IAudra, am serving as a trained medical scribe to document services personally performed by Marilyn Brunner MD, based on the provider's statements to me.   Marilyn MARTINEZ MD, was physically present and have reviewed and verified the accuracy of this note documented by Audra العلي.    Marilyn Brunner MD  [facility] EMERGENCY DEPARTMENT  1/12/2023     Marilyn Brunner MD  01/13/23 0140        Marilyn Brunner MD  01/17/23 2013

## 2023-01-13 NOTE — PROGRESS NOTES
At 1635, lab went into pt's room to collect blood cultures. The pt's door was left open and staff members began smelling cigarette smoke coming from the pt's room and spreading throughout the hallways. Writer notified MD & security was called w/ room search order placed. Security conducted the room search and found a pack of cigarettes and a recently smoked cigarette butt with ashes in a pudding cup. There was no lighter to be found, we believe the lighter is on his person. Per security, we may need an additional order to search his person - MD notified of findings & updates. MD placed new order at 1700 for security to search pt to hopefully find lighter - security called. Lighter found and taken from pt.    Writer & charge nurse educated pt on the importance of not smoking in hospital/room and that it is not allowed various times throughout shift.

## 2023-01-13 NOTE — ED TRIAGE NOTES
Triage Assessment     Row Name 01/12/23 2605       Triage Assessment (Adult)    Airway WDL WDL       Respiratory WDL    Respiratory WDL WDL       Skin Circulation/Temperature WDL    Skin Circulation/Temperature WDL X  wound to left elbow approximated with sutures       Cardiac WDL    Cardiac WDL WDL       Peripheral/Neurovascular WDL    Peripheral Neurovascular WDL WDL       Cognitive/Neuro/Behavioral WDL    Cognitive/Neuro/Behavioral WDL WDL

## 2023-01-13 NOTE — PROGRESS NOTES
Navi Kern is a 28-year-old male with a past medical history of substance use disorder, with IV drug use, undomiciled, presenting to the hospital for evaluation of left arm pain.  Patient currently admitted for sepsis likely due to left arm complicated cellulitis, query septic arthritis +/- osteomyelitis.    Based on chart review, patient was seen at [facility] where he underwent MRI imaging showing significant cellulitis with abscess, he also underwent I&D with reported drainage of purulent fluid which cultures growing  4+ Streptococcus intermedius + parvimonas (I called microbiology at [facility], no susceptibilities were performed for the cultures given low quantity).  Patient was ultimately managed with IV Unasyn, however, patient signed out AMA from [facility], and did not appear patient was discharged on oral antibiotics.     Patient did not follow-up with orthopedic surgery for scheduled outpatient care.    Since arrival to the hospital, T-max in the last 24 hours 100.5.  Examination notable for tender left elbow with some fluctuance; stitches in place (see image below).        Problem list  #Sepsis  #Left upper extremity soft tissue infection  #Substance use disorder, with recent IV drug use, patient not interested in talking to chemical dependence or addiction medicine.  #Normocytic anemia    Plan    Continue antibiotics with IV Unasyn    Anticipate ID recommendations    Orthopedic surgery consulted    Follow-up ultrasound of left upper extremity    Pain control with Tylenol, naproxen as needed.        PUNEET MILLER MD  Hospitalist Service  [facility]

## 2023-01-14 PROCEDURE — 250N000011 HC RX IP 250 OP 636: Performed by: PHYSICIAN ASSISTANT

## 2023-01-14 PROCEDURE — 99233 SBSQ HOSP IP/OBS HIGH 50: CPT | Performed by: INTERNAL MEDICINE

## 2023-01-14 PROCEDURE — 250N000013 HC RX MED GY IP 250 OP 250 PS 637: Performed by: PEDIATRICS

## 2023-01-14 PROCEDURE — 258N000003 HC RX IP 258 OP 636: Performed by: PEDIATRICS

## 2023-01-14 PROCEDURE — 120N000002 HC R&B MED SURG/OB UMMC

## 2023-01-14 RX ADMIN — ACETAMINOPHEN 975 MG: 325 TABLET, FILM COATED ORAL at 07:50

## 2023-01-14 RX ADMIN — SODIUM CHLORIDE: 9 INJECTION, SOLUTION INTRAVENOUS at 07:47

## 2023-01-14 RX ADMIN — ACETAMINOPHEN 975 MG: 325 TABLET, FILM COATED ORAL at 17:07

## 2023-01-14 RX ADMIN — CEFTRIAXONE SODIUM 2 G: 2 INJECTION, POWDER, FOR SOLUTION INTRAMUSCULAR; INTRAVENOUS at 17:07

## 2023-01-14 ASSESSMENT — ACTIVITIES OF DAILY LIVING (ADL)
ADLS_ACUITY_SCORE: 29

## 2023-01-14 NOTE — PLAN OF CARE
VS: Pt refused VS during shift.    O2: RA. Denies chest pain. Denies sob.    Output: Continent of bowel and bladder. Urinal at bedside    Activity: Independent in the room; stayed in bed during this shift    Skin: Pt refused skin assessment    Pain: C/o of generalized body aches; received scheduled Tylenol    CMS: A&Ox4. Pt uncooperative, irritable and frustrated this morning. Pt does not want to be waken up by staff     Diet: Regular diet;  Had 100% of lunch    LDA: PIV right arm; SL. IVF discontinued today    Plan: Continue with plan of care; call light within reach. Pt able to make needs known.

## 2023-01-14 NOTE — PROGRESS NOTES
"[id number]    Plan of Care Reviewed With: Patient    Overall Patient Progress: No Change    Outcome Evaluation: Pt is A & O x4 on RA. C/O 7/10 generalized body aches - administered scheduled Tylenol. Denies nausea, chest pain & SOB. Pt has R PIV continuously infusing 125 ml/hr & intermittently infusing antibiotics. Pt refused full skin assessment - visible skin intact other than LUE/elbow prior surgical incision. Pt is independent, voids spontaneously & uses call light appropriately.    Shift Updates    Pt actively smoking in room at start of shift - see previous progress note on details of room search.    Received two critical lab values during shift r/t blood cultures - gram positive cocci in sukumar and chains & streptococcus species. MD aware - made changes to antibiotics.      Pt still refused to go down for xray during shift - will try again tomorrow.     At 1930, pt refused lab draws, became increasingly agitated & started yelling at stuff - MD notified.     Vitals: /65 (BP Location: Right arm, Patient Position: Left side, Cuff Size: Adult Regular)   Pulse 97   Temp 98.4  F (36.9  C)   Resp 16   Ht 1.803 m (5' 10.98\")   Wt 65.8 kg (145 lb)   SpO2 97%   BMI 20.23 kg/m      Plan: TBD. Continue w/ plan of care.       "

## 2023-01-14 NOTE — PLAN OF CARE
[id number]    Pt alert and oriented X 4. Uncooperative and verbally abusive to NST. Refused scheduled Tylenol and routine vital signs. PIV patent, NS infusing at 125 ml/hr. Will continue to monitor and follow POC.

## 2023-01-14 NOTE — PROGRESS NOTES
[facility]    Medicine Progress Note - Hospitalist Service, GOLD TEAM 18    Date of Admission:  1/12/2023    Assessment & Plan   Navi Kern is a 28-year-old male with a past medical history of substance use disorder, with IV drug use, undomiciled, presenting to the hospital for evaluation of left arm pain.  Patient currently admitted for sepsis likely due to left arm complicated cellulitis, query septic arthritis +/- osteomyelitis    #Sepsis  #GPC bacteremia  #Left upper extremity soft tissue infection  #Substance use disorder, with recent IV drug use, patient not interested in talking to chemical dependence or addiction medicine.  #Normocytic anemia     Plan    Infectious disease on board, vancomycin and Unasyn discontinued, started on ceftriaxone 2 g every 24 hours.    Repeating blood cultures     F/up HIV, hepatitis panel, RPR    Orthopedic surgery consulted    Follow-up ultrasound & Xray imaging of left upper extremity    Pain control with Tylenol, naproxen as needed.    Patient is declining addiction medicine or CD consult at this time.         Diet: Regular Diet Adult    DVT Prophylaxis: Pneumatic Compression Devices  Garrett Catheter: Not present  Lines: None     Cardiac Monitoring: None  Code Status: Full Code      Clinically Significant Risk Factors              # Hypoalbuminemia: Lowest albumin = 2.9 g/dL at 1/12/2023 11:55 PM, will monitor as appropriate                   Disposition Plan  TBD      Expected Discharge Date: 01/14/2023                  PUNEET MILLER MD  Hospitalist Service, GOLD TEAM 18  [facility]  Securely message with [employer] (more info)  Text page via [employer] Paging/Directory   See signed in provider for up to date coverage information  ______________________________________________________________________    Interval History   -GPC bacteremia yesterday   -Unfortunately, patient was noted to  be smoking in his room.  Security called, cigarettes was taking away from patient.  -willing to cooperate with tests and imaging    Physical Exam   Vital Signs: Temp: 98.4  F (36.9  C)   BP: 114/65 Pulse: 97   Resp: 16 SpO2: 97 % O2 Device: None (Room air)    Weight: 145 lbs 0 oz    General Appearance: Lying comfortably in bed, on room air, in no acute distress of discomfort  HEENT: PERRL: EOMI; moist mucous membrane w/o lesions  Neck: No JVD  Pulmonary: Clear to auscultation bilaterally, no wheezes or crackles  CVS: Regular rhythm, no murmurs, rubs or gallops  GI: BS (+), soft nontender, no rebound or guarding   Extremities: tender left elbow with some fluctuance; stitches in place  Skin: No rashes or lesions  Neurologic: A&O x3      Medical Decision Making       55 MINUTES SPENT BY ME on the date of service doing chart review, history, exam, documentation & further activities per the note.      Data   ------------------------- PAST 24 HR DATA REVIEWED -----------------------------------------------        Imaging results reviewed over the past 24 hrs:   No results found for this or any previous visit (from the past 24 hour(s)).

## 2023-01-15 ENCOUNTER — APPOINTMENT (OUTPATIENT)
Dept: GENERAL RADIOLOGY | Facility: CLINIC | Age: 28
End: 2023-01-15
Attending: PHYSICIAN ASSISTANT
Payer: COMMERCIAL

## 2023-01-15 ENCOUNTER — APPOINTMENT (OUTPATIENT)
Dept: ULTRASOUND IMAGING | Facility: CLINIC | Age: 28
End: 2023-01-15
Attending: PEDIATRICS
Payer: COMMERCIAL

## 2023-01-15 ENCOUNTER — APPOINTMENT (OUTPATIENT)
Dept: GENERAL RADIOLOGY | Facility: CLINIC | Age: 28
End: 2023-01-15
Payer: COMMERCIAL

## 2023-01-15 VITALS
BODY MASS INDEX: 20.3 KG/M2 | WEIGHT: 145 LBS | TEMPERATURE: 99.3 F | RESPIRATION RATE: 16 BRPM | HEIGHT: 71 IN | OXYGEN SATURATION: 100 % | SYSTOLIC BLOOD PRESSURE: 120 MMHG | DIASTOLIC BLOOD PRESSURE: 90 MMHG | HEART RATE: 89 BPM

## 2023-01-15 PROCEDURE — 76882 US LMTD JT/FCL EVL NVASC XTR: CPT | Mod: 26 | Performed by: RADIOLOGY

## 2023-01-15 PROCEDURE — 71046 X-RAY EXAM CHEST 2 VIEWS: CPT | Mod: 26 | Performed by: RADIOLOGY

## 2023-01-15 PROCEDURE — 999N000127 HC STATISTIC PERIPHERAL IV START W US GUIDANCE

## 2023-01-15 PROCEDURE — 250N000013 HC RX MED GY IP 250 OP 250 PS 637: Performed by: PEDIATRICS

## 2023-01-15 PROCEDURE — 71046 X-RAY EXAM CHEST 2 VIEWS: CPT

## 2023-01-15 PROCEDURE — 73080 X-RAY EXAM OF ELBOW: CPT | Mod: LT

## 2023-01-15 PROCEDURE — 76882 US LMTD JT/FCL EVL NVASC XTR: CPT | Mod: LT

## 2023-01-15 PROCEDURE — 99233 SBSQ HOSP IP/OBS HIGH 50: CPT | Performed by: INTERNAL MEDICINE

## 2023-01-15 RX ADMIN — ACETAMINOPHEN 975 MG: 325 TABLET, FILM COATED ORAL at 09:15

## 2023-01-15 ASSESSMENT — ACTIVITIES OF DAILY LIVING (ADL)
ADLS_ACUITY_SCORE: 29

## 2023-01-15 NOTE — DISCHARGE SUMMARY
The patient has decided to leave AMA - we are unable to convince the patient to stay. Discussed with the patient the risks of leaving against medical advice. Pt left the unit before his IV was removed. Patient declined to wait for discharge instructions, to speak with a provider and to sign form.  Attempted to call the patient but there is no phone number documented in the chart secondary to patient being homeless. Provider notified and aware.    Ana Jean Baptiste RN

## 2023-01-15 NOTE — PROGRESS NOTES
"[phone]    Plan of Care Reviewed With: Patient    Overall Patient Progress: No Change    Outcome Evaluation: Pt is A & O x4 on RA. C/O 5/10 pain - administered scheduled Tylenol. Denies nausea, chest pain & SOB. No IV access - waiting for placement. Pt refused full skin assessment - visible skin intact other than LUE/elbow prior surgical incision. Pt is independent, voids spontaneously & uses call light appropriately.    Shift Updates:     Around 1743, pt became verbally aggressive towards staff, hitting his bed/kicking his feet, started howling in his room & then pulled his IV out - MD aware.    Plan to try for other IV placement - peds vascular paged.     Pt also refused scheduled lab draws - MD aware.     Vitals: /80 (BP Location: Right arm)   Pulse 89   Temp 99.3  F (37.4  C) (Oral)   Resp 16   Ht 1.803 m (5' 10.98\")   Wt 65.8 kg (145 lb)   SpO2 96%   BMI 20.23 kg/m      Plan: TBD, expected discharge date 1/14. Continue w/ plan of care.   "

## 2023-01-15 NOTE — DISCHARGE SUMMARY
[facility]  Hospitalist Discharge Summary      AGAINST MEDICAL ADVICE     Date of Admission:  1/12/2023  Date of Discharge:  1/15/2023  Discharging Provider: PUNEET MILLER MD  Discharge Service: Hospitalist Service, GOLD TEAM 18    Discharge Diagnoses   #Sepsis  #GPC bacteremia  #Left upper extremity soft tissue infection   #Substance use disorder, with recent IV drug use  #Disruptive behavior      Follow-ups Needed After Discharge   N/a as patient left AMA    Unresulted Labs Ordered in the Past 30 Days of this Admission     Date and Time Order Name Status Description    1/13/2023  3:24 PM Hepatitis C antibody In process     1/13/2023  3:24 PM HIV Antigen Antibody Combo In process     1/12/2023 11:05 PM Blood Culture Peripheral Blood Preliminary     1/12/2023 11:05 PM Blood Culture Peripheral Blood Preliminary           Discharge Disposition   Discharged to home  AMA  Condition at discharge: AMA      Hospital Course   Navi Kern is a 28-year-old male with a past medical history of substance use disorder, with IV drug use, undomiciled, presenting to the hospital for evaluation of left arm pain.  Patient currently admitted for sepsis likely due to left arm complicated cellulitis, query septic arthritis +/- osteomyelitis. Patient also found to have bacteremia with GPC, likely strep. During hospital stay, patient displayed multiple disruptive behavior including smoking cigarette, being verbally abusive to staff members. ID and Ortho were consulted. Patient was managed with IV Ceftriaxone.   On 1/15/23, patient left hospital AMA without waiting for provider to assess.     Consultations This Hospital Stay   PHARMACY TO DOSE VANCO  INFECTIOUS DISEASE [facility] ADULT IP CONSULT  ORTHOPAEDIC SURGERY ADULT/PEDS IP CONSULT  PHARMACY TO DOSE VANCO    Code Status   Full Code    Time Spent on this Encounter   I, PUNEET MILLER MD, did not discharge this patient today, as  patient left AMA.        PUNEET MILLER MD  [facility]  [address] [facility] [address]  Phone: [phone]  Fax: [phone]  ______________________________________________________________________    Physical Exam   Vital Signs: Temp: 99.3  F (37.4  C) Temp src: Oral BP: (!) 120/90 Pulse: 89   Resp: 16 SpO2: 100 % O2 Device: None (Room air)    Weight: 145 lbs 0 oz    Did not examine       Primary Care Physician   Physician No Ref-Primary    Discharge Orders   No discharge procedures on file.    Significant Results and Procedures   Most Recent 3 CBC's:Recent Labs   Lab Test 01/12/23  2355   WBC 7.8   HGB 11.0*   MCV 88        Most Recent 3 BMP's:Recent Labs   Lab Test 01/12/23  2355      POTASSIUM 3.6   CHLORIDE 105   CO2 26   BUN 15   CR 0.88   ANIONGAP 7   MUKUND 8.6   GLC 99     Most Recent 2 LFT's:Recent Labs   Lab Test 01/12/23  2355   AST 19   ALT 39   ALKPHOS 102   BILITOTAL 0.5   ,   Results for orders placed or performed during the hospital encounter of 01/12/23   US Upper Extremity Non Vascular Left    Narrative    EXAMINATION: Soft tissue ultrasound elbow 1/15/2023 7:46 AM     COMPARISON: None.    HISTORY: had abscess and septic joint left elbow end of dec, didn't  complete treatment; area of warm fluctuance medial aspect left elbow    TECHNIQUE: Clinical area of interest was scanned in the standard  fashion with specialized ultrasound transducer(s) using both gray  scale and limited color/spectral Doppler techniques.    FINDINGS:  No fluid collections are visualized, specifically no sonographic  evidence suggestive of abscess. Hypoechoic avascular ovoid structure  with central hyperechoic focus visualized in the left medial elbow,  presumed lymph node.       Impression    IMPRESSION:  No fluid collections/abscesses are identified by ultrasound.     I have personally reviewed the examination and initial interpretation  and I agree with the  findings.    GERARDO INTERIANO MD         SYSTEM ID:  [id number]   XR Elbow Left G/E 3 Views    Narrative    EXAM: XR ELBOW LEFT G/E 3 VIEWS  LOCATION: [facility]  DATE/TIME: 1/15/2023 7:49 AM    INDICATION: Left elbow pain, swelling, and cellulitis.  COMPARISON: None.      Impression    IMPRESSION: Normal elbow radiographs. No joint effusion. No fracture, periostitis, cortical erosion, or osteolysis. Normal joint alignment, with maintained joint spacing.   XR Chest 2 Views    Narrative    XR CHEST 2 VIEWS  1/15/2023 7:48 AM      HISTORY: bacteremia; cough    COMPARISON: none available    FINDINGS: Upright, PA and lateral views of the chest.     The cardiac silhouette size is within normal limits. No significant  pleural effusion or pneumothorax. No airspace consolidation. The  visualized upper abdomen and osseus structures appear normal.      Impression    IMPRESSION: No acute cardiopulmonary findings.     I have personally reviewed the examination and initial interpretation  and I agree with the findings.    GERARDO INTERIANO MD         SYSTEM ID:  [id number]       Discharge Medications   There are no discharge medications for this patient.    Allergies   No Known Allergies

## 2023-01-15 NOTE — PROGRESS NOTES
"[facility]    Medicine Progress Note - Hospitalist Service, GOLD TEAM 18    Date of Admission:  1/12/2023    Assessment & Plan   Navi Kern is a 28-year-old male with a past medical history of substance use disorder, with IV drug use, undomiciled, presenting to the hospital for evaluation of left arm pain.  Patient currently admitted for sepsis likely due to left arm complicated cellulitis, query septic arthritis +/- osteomyelitis    #Sepsis  #GPC bacteremia  #Left upper extremity soft tissue infection   X-ray of left elbow on 1/15/23 showing \"Normal elbow radiographs. No joint effusion. No fracture, periostitis, cortical erosion, or osteolysis. Normal joint alignment, with maintained joint spacing\"  Plan    Infectious disease on board, vancomycin and Unasyn discontinued, started on ceftriaxone 2 g every 24 hours.    Repeating blood cultures     F/up HIV, hepatitis panel, RPR    Orthopedic surgery consulted    Follow-up ultrasound & Xray imaging of left upper extremity    #Substance use disorder, with recent IV drug use  #Disruptive behavior  Patient noted during this admission to be smoking cigarette in room on 1/13/23, searched by security. He has had intermittent outburst with staff while also refusing care such as blood draws and imaging. Even with hx of substance use, he declines CD, & addiction medicine consult    Pain control with Tylenol, naproxen as needed.    Patient is declining addiction medicine or CD consult at this time.    #Normocytic anemia  querry anemia of chronic disease, +/- iron deficiency given elevated RDW.    Trend labs    Obtain iron panel      Diet: Regular Diet Adult    DVT Prophylaxis: Pneumatic Compression Devices  Garrett Catheter: Not present  Lines: None     Cardiac Monitoring: None  Code Status: Full Code      Clinically Significant Risk Factors              # Hypoalbuminemia: Lowest albumin = 2.9 g/dL at 1/12/2023 11:55 PM, will " monitor as appropriate                   Disposition Plan  TBD            PUNEET MILLER MD  Hospitalist Service, GOLD TEAM 18  [facility]  Securely message with [employer] (more info)  Text page via [employer] Paging/Directory   See signed in provider for up to date coverage information  ______________________________________________________________________    Interval History   -patient noted to have outburst with staff members, verbally aggressive and pulling his IV  - some left elbow pain that travels all over his body     Physical Exam   Vital Signs: Temp: 99.3  F (37.4  C) Temp src: Oral BP: 121/80 Pulse: 89   Resp: 16 SpO2: 96 % O2 Device: None (Room air)    Weight: 145 lbs 0 oz    General Appearance: Lying comfortably in bed, on room air, in no acute distress of discomfort  HEENT: PERRL: EOMI; moist mucous membrane w/o lesions  Neck: No JVD  Pulmonary: Clear to auscultation bilaterally, no wheezes or crackles  CVS: Regular rhythm, no murmurs, rubs or gallops  GI: BS (+), soft nontender, no rebound or guarding   Extremities: tender left elbow, no appreciable fluctuance; stitches remain in place  Skin: No rashes or lesions  Neurologic: A&O x3      Medical Decision Making       55 MINUTES SPENT BY ME on the date of service doing chart review, history, exam, documentation & further activities per the note.      Data   ------------------------- PAST 24 HR DATA REVIEWED -----------------------------------------------        Imaging results reviewed over the past 24 hrs:   No results found for this or any previous visit (from the past 24 hour(s)).

## 2023-01-15 NOTE — PLAN OF CARE
[id number]    Pt alert and oriented X 4. Pt continues to be uncooperative and refusing scheduled Tylenol and routine vital signs; stated that he doesn't want to be bother past midnight. Will continue to follow POC.

## 2023-01-16 ENCOUNTER — PATIENT OUTREACH (OUTPATIENT)
Dept: CARE COORDINATION | Facility: CLINIC | Age: 28
End: 2023-01-16

## 2023-01-16 LAB — HIV 1+2 AB+HIV1 P24 AG SERPL QL IA: NONREACTIVE

## 2023-01-16 NOTE — PROGRESS NOTES
[facility]    Background: Transitional Care Management program identified per system criteria and reviewed by [facility] team for possible outreach.    Assessment: Upon chart review, CCR Team member will not proceed with patient outreach related to this episode of Transitional Care Management program due to reason below:    Missing/ invalid number.    Plan: Transitional Care Management episode addressed appropriately per reason noted above.      LINDA Figueroa  [facility], [facility]    *Connected Care Resource Team does NOT follow patient ongoing. Referrals are identified based on internal discharge reports and the outreach is to ensure patient has an understanding of their discharge instructions.

## 2023-01-17 LAB — HCV AB SERPL QL IA: REACTIVE

## 2023-01-18 LAB — BACTERIA BLD CULT: NO GROWTH

## 2023-01-19 LAB
BACTERIA BLD CULT: ABNORMAL

## 2023-03-24 ENCOUNTER — HOSPITAL ENCOUNTER (EMERGENCY)
Facility: CLINIC | Age: 28
Discharge: HOME | End: 2023-03-25
Attending: INTERNAL MEDICINE | Admitting: INTERNAL MEDICINE
Payer: MEDICAID

## 2023-03-24 DIAGNOSIS — T33.90XS FROSTBITE, SEQUELA: ICD-10-CM

## 2023-03-24 DIAGNOSIS — T14.8XXA OPEN WOUND: ICD-10-CM

## 2023-03-24 DIAGNOSIS — T33.821S FROSTBITE OF BOTH FEET, SEQUELA: ICD-10-CM

## 2023-03-24 DIAGNOSIS — E86.0 DEHYDRATION: ICD-10-CM

## 2023-03-24 DIAGNOSIS — Z59.00 HOMELESS: ICD-10-CM

## 2023-03-24 DIAGNOSIS — T33.822S FROSTBITE OF BOTH FEET, SEQUELA: ICD-10-CM

## 2023-03-24 DIAGNOSIS — T33.522S: ICD-10-CM

## 2023-03-24 LAB
BASOPHILS # BLD AUTO: 0 10E3/UL (ref 0–0.2)
BASOPHILS NFR BLD AUTO: 1 %
EOSINOPHIL # BLD AUTO: 0.2 10E3/UL (ref 0–0.7)
EOSINOPHIL NFR BLD AUTO: 5 %
ERYTHROCYTE [DISTWIDTH] IN BLOOD BY AUTOMATED COUNT: 13.8 % (ref 10–15)
ERYTHROCYTE [SEDIMENTATION RATE] IN BLOOD BY WESTERGREN METHOD: 8 MM/HR (ref 0–15)
HCO3 BLDV-SCNC: 26 MMOL/L (ref 21–28)
HCT VFR BLD AUTO: 41.5 % (ref 40–53)
HGB BLD-MCNC: 14.2 G/DL (ref 13.3–17.7)
IMM GRANULOCYTES # BLD: 0 10E3/UL
IMM GRANULOCYTES NFR BLD: 0 %
LACTATE BLD-SCNC: 2.3 MMOL/L
LYMPHOCYTES # BLD AUTO: 1.2 10E3/UL (ref 0.8–5.3)
LYMPHOCYTES NFR BLD AUTO: 28 %
MCH RBC QN AUTO: 28 PG (ref 26.5–33)
MCHC RBC AUTO-ENTMCNC: 34.2 G/DL (ref 31.5–36.5)
MCV RBC AUTO: 82 FL (ref 78–100)
MONOCYTES # BLD AUTO: 0.5 10E3/UL (ref 0–1.3)
MONOCYTES NFR BLD AUTO: 10 %
NEUTROPHILS # BLD AUTO: 2.5 10E3/UL (ref 1.6–8.3)
NEUTROPHILS NFR BLD AUTO: 56 %
NRBC # BLD AUTO: 0 10E3/UL
NRBC BLD AUTO-RTO: 0 /100
PCO2 BLDV: 43 MM HG (ref 40–50)
PH BLDV: 7.4 [PH] (ref 7.32–7.43)
PLATELET # BLD AUTO: 329 10E3/UL (ref 150–450)
PO2 BLDV: 69 MM HG (ref 25–47)
RBC # BLD AUTO: 5.08 10E6/UL (ref 4.4–5.9)
SAO2 % BLDV: 93 % (ref 94–100)
WBC # BLD AUTO: 4.4 10E3/UL (ref 4–11)

## 2023-03-24 PROCEDURE — 85652 RBC SED RATE AUTOMATED: CPT | Performed by: INTERNAL MEDICINE

## 2023-03-24 PROCEDURE — 84145 PROCALCITONIN (PCT): CPT | Performed by: INTERNAL MEDICINE

## 2023-03-24 PROCEDURE — 85025 COMPLETE CBC W/AUTO DIFF WBC: CPT | Performed by: INTERNAL MEDICINE

## 2023-03-24 PROCEDURE — 80053 COMPREHEN METABOLIC PANEL: CPT | Performed by: INTERNAL MEDICINE

## 2023-03-24 PROCEDURE — 82803 BLOOD GASES ANY COMBINATION: CPT

## 2023-03-24 PROCEDURE — 83605 ASSAY OF LACTIC ACID: CPT | Mod: 91

## 2023-03-24 PROCEDURE — 99283 EMERGENCY DEPT VISIT LOW MDM: CPT | Mod: 25 | Performed by: INTERNAL MEDICINE

## 2023-03-24 PROCEDURE — 86140 C-REACTIVE PROTEIN: CPT | Performed by: INTERNAL MEDICINE

## 2023-03-24 PROCEDURE — 99283 EMERGENCY DEPT VISIT LOW MDM: CPT | Performed by: INTERNAL MEDICINE

## 2023-03-24 PROCEDURE — 36415 COLL VENOUS BLD VENIPUNCTURE: CPT | Performed by: INTERNAL MEDICINE

## 2023-03-24 SDOH — ECONOMIC STABILITY - HOUSING INSECURITY: HOMELESSNESS UNSPECIFIED: Z59.00

## 2023-03-24 ASSESSMENT — ACTIVITIES OF DAILY LIVING (ADL): ADLS_ACUITY_SCORE: 35

## 2023-03-25 VITALS
DIASTOLIC BLOOD PRESSURE: 94 MMHG | RESPIRATION RATE: 17 BRPM | SYSTOLIC BLOOD PRESSURE: 110 MMHG | HEART RATE: 95 BPM | OXYGEN SATURATION: 100 % | TEMPERATURE: 98.5 F

## 2023-03-25 LAB
ALBUMIN SERPL BCG-MCNC: 4 G/DL (ref 3.5–5.2)
ALP SERPL-CCNC: 97 U/L (ref 40–129)
ALT SERPL W P-5'-P-CCNC: 68 U/L (ref 10–50)
ANION GAP SERPL CALCULATED.3IONS-SCNC: 11 MMOL/L (ref 7–15)
AST SERPL W P-5'-P-CCNC: 43 U/L (ref 10–50)
BILIRUB SERPL-MCNC: <0.2 MG/DL
BUN SERPL-MCNC: 15 MG/DL (ref 6–20)
CALCIUM SERPL-MCNC: 9.2 MG/DL (ref 8.6–10)
CHLORIDE SERPL-SCNC: 103 MMOL/L (ref 98–107)
CREAT SERPL-MCNC: 0.86 MG/DL (ref 0.67–1.17)
CRP SERPL-MCNC: <3 MG/L
DEPRECATED HCO3 PLAS-SCNC: 26 MMOL/L (ref 22–29)
GFR SERPL CREATININE-BSD FRML MDRD: >90 ML/MIN/1.73M2
GLUCOSE SERPL-MCNC: 114 MG/DL (ref 70–99)
HCO3 BLDV-SCNC: 25 MMOL/L (ref 21–28)
HCO3 BLDV-SCNC: 27 MMOL/L (ref 21–28)
LACTATE BLD-SCNC: 1.9 MMOL/L
LACTATE BLD-SCNC: 2.7 MMOL/L
PCO2 BLDV: 43 MM HG (ref 40–50)
PCO2 BLDV: 45 MM HG (ref 40–50)
PH BLDV: 7.37 [PH] (ref 7.32–7.43)
PH BLDV: 7.38 [PH] (ref 7.32–7.43)
PO2 BLDV: 65 MM HG (ref 25–47)
PO2 BLDV: 99 MM HG (ref 25–47)
POTASSIUM SERPL-SCNC: 3.8 MMOL/L (ref 3.4–5.3)
PROCALCITONIN SERPL IA-MCNC: 0.03 NG/ML
PROT SERPL-MCNC: 7.1 G/DL (ref 6.4–8.3)
SAO2 % BLDV: 92 % (ref 94–100)
SAO2 % BLDV: 98 % (ref 94–100)
SODIUM SERPL-SCNC: 140 MMOL/L (ref 136–145)

## 2023-03-25 PROCEDURE — 96360 HYDRATION IV INFUSION INIT: CPT | Performed by: INTERNAL MEDICINE

## 2023-03-25 PROCEDURE — 82803 BLOOD GASES ANY COMBINATION: CPT

## 2023-03-25 PROCEDURE — 96361 HYDRATE IV INFUSION ADD-ON: CPT | Performed by: INTERNAL MEDICINE

## 2023-03-25 PROCEDURE — 258N000003 HC RX IP 258 OP 636: Performed by: INTERNAL MEDICINE

## 2023-03-25 RX ADMIN — SODIUM CHLORIDE 2000 ML: 9 INJECTION, SOLUTION INTRAVENOUS at 00:44

## 2023-03-25 ASSESSMENT — ACTIVITIES OF DAILY LIVING (ADL): ADLS_ACUITY_SCORE: 35

## 2023-03-25 NOTE — ED NOTES
Emergency Department Patient Sign-out       Brief HPI and ED course:  Patient is a 28 year old male signed out to me by Dr. Loyola.  See initial ED Provider note for details of the presentation. In brief, infected frostbite a month or two ago. Infectious work-up now unremarkable with exception of a mildly elevated lactate. Patient appears hypovolemic, sepsis not suspected.     Vitals:   Patient Vitals for the past 24 hrs:   BP Temp Temp src Pulse Resp SpO2   03/24/23 2140 134/85 98.3  F (36.8  C) Oral 90 16 100 %       Received Sign-out Plan:    Pending studies include:       Plan:   - finish IV fluids  - recheck lactate  - if lactate appropriately falling then likely outpatient management       Events after assuming care:  After care was assumed, a focused history and physical was performed. Agree with findings relayed by previous provider.     Repeat lactate wnl at 1.9.     After counseling on the diagnosis, work-up, and treatment plan, the patient was discharged to home. The patient was advised to follow-up with primary care in a few days. The patient was advised to return to the ED if worsening symptoms, or if there are any urgent/life-threatening concerns.     Final diagnoses:   Open wound   Frostbite, sequela   Homeless   Dehydration     New Prescriptions    No medications on file     --  Sumeet Escobedo MD   Emergency Medicine   [facility] EMERGENCY DEPARTMENT  3/24/2023       Sumeet Escobedo MD  03/25/23 [id number]

## 2023-03-25 NOTE — DISCHARGE INSTRUCTIONS
Instructions from your doctor today:  Emergency Department (ED) testing is focused on the potential causes of your symptoms that are the most dangerous possibilities, and cannot cover every possibility. Based on the evaluation, it was deemed sufficiently safe to discharge and continue management through the clinics. Thus, follow-up is very important to assess for improvement/worsening, potential further testing, and potential treatment adjustments. If you were given opioid pain medications or other medications that can make you drowsy while in the ED, you should not drive for at least several hours and not until you feel completely back to normal.     Please make an appointment to follow up with:  - Your Primary Care Provider in 2-4 days  - If you do not have a primary care provider, you can be seen in follow-up and establish care by calling any of the clinics below:     - [facility] Center (phone: [phone])     - Primary Care / [facility] (phone: [phone])   - Have your clinic provider review the results from today's visit with you again, including any potential follow-up or additional testing that may be needed based on the results. Occasionally, incidental findings are found on later review by radiologists that may need follow-up.     Return to the Emergency Department immediately if you have worsening symptoms, or any other urgent health concerns.

## 2023-03-25 NOTE — ED PROVIDER NOTES
"ED Provider Note  [facility]      History     Chief Complaint   Patient presents with     Frostbite     Onset 2 months ago with frostbite in both feet and left hand, continues with  pain and infection, \"Now I have infection in my blood.\"     The history is provided by the patient and medical records.     Navi Kern is a 28 year old male with history of polysubstance use disorder w/ IVDU and admission 1/12-15 due to sepsis likely d/t left arm complicated cellulitis in the setting of I&D 12/23 and 12/28 w/ wound cultures growing Parvimonas micra and Strep intermedius. Patient now presenting to the ED with pain in the left hand and foot. Per review of chart patient left AMA without antibiotics multiple times since initial presentation for cellulitis in 12/2022. Here patient reports he had frostbite in the left hand and both feet (L>R) a few months ago and continues to have pain. He also has an unhealing open wound on the left great toe. He reports headache and body aches. No fever. He presents today due to concern for ongoing infection after leaving AMA in January. He is still homeless and continues to use IV drugs.     Past Medical History  Past Medical History:   Diagnosis Date     Substance abuse (H)      Past Surgical History:   Procedure Laterality Date     ORTHOPEDIC SURGERY       No current outpatient medications on file.    No Known Allergies  Family History  No family history on file.  Social History   Social History     Tobacco Use     Smoking status: Every Day     Types: Cigarettes     Smokeless tobacco: Never   Substance Use Topics     Alcohol use: Not Currently     Drug use: Yes     Comment: crystal meth         A medically appropriate review of systems was performed with pertinent positives and negatives noted in the HPI, and all other systems negative.    Physical Exam   BP: 134/85  Pulse: 90  Temp: 98.3  F (36.8  C)  Resp: 16  SpO2: 100 %  Physical Exam  Vitals and " nursing note reviewed.   Constitutional:       General: He is not in acute distress.     Appearance: Normal appearance. He is not toxic-appearing or diaphoretic.   HENT:      Head: Atraumatic.      Nose: No congestion.      Mouth/Throat:      Mouth: Mucous membranes are dry.   Eyes:      General: No scleral icterus.     Conjunctiva/sclera: Conjunctivae normal.      Pupils: Pupils are equal, round, and reactive to light.   Cardiovascular:      Rate and Rhythm: Normal rate.      Heart sounds: Normal heart sounds. No murmur heard.    No friction rub. No gallop.   Pulmonary:      Effort: Pulmonary effort is normal. No respiratory distress.      Breath sounds: Normal breath sounds. No stridor. No wheezing, rhonchi or rales.   Chest:      Chest wall: No tenderness.   Abdominal:      General: Abdomen is flat. Bowel sounds are normal. There is no distension.      Palpations: Abdomen is soft. There is no mass.      Tenderness: There is no abdominal tenderness. There is no right CVA tenderness, left CVA tenderness, guarding or rebound.      Hernia: No hernia is present.   Musculoskeletal:         General: No swelling or tenderness.      Cervical back: Neck supple. No tenderness.   Lymphadenopathy:      Cervical: No cervical adenopathy.   Skin:     General: Skin is warm.      Capillary Refill: Capillary refill takes less than 2 seconds.      Findings: Wound (healing) present. No rash.          Neurological:      General: No focal deficit present.      Mental Status: He is alert.      Cranial Nerves: No cranial nerve deficit.   Psychiatric:         Mood and Affect: Mood normal.         Thought Content: Thought content normal.           ED Course, Procedures, & Data      Procedures                     Results for orders placed or performed during the hospital encounter of 03/24/23   CRP inflammation     Status: Normal   Result Value Ref Range    CRP Inflammation <3.00 <5.00 mg/L   Erythrocyte sedimentation rate auto     Status:  Normal   Result Value Ref Range    Erythrocyte Sedimentation Rate 8 0 - 15 mm/hr   Procalcitonin     Status: Normal   Result Value Ref Range    Procalcitonin 0.03 <0.05 ng/mL   CBC with platelets and differential     Status: None   Result Value Ref Range    WBC Count 4.4 4.0 - 11.0 10e3/uL    RBC Count 5.08 4.40 - 5.90 10e6/uL    Hemoglobin 14.2 13.3 - 17.7 g/dL    Hematocrit 41.5 40.0 - 53.0 %    MCV 82 78 - 100 fL    MCH 28.0 26.5 - 33.0 pg    MCHC 34.2 31.5 - 36.5 g/dL    RDW 13.8 10.0 - 15.0 %    Platelet Count 329 150 - 450 10e3/uL    % Neutrophils 56 %    % Lymphocytes 28 %    % Monocytes 10 %    % Eosinophils 5 %    % Basophils 1 %    % Immature Granulocytes 0 %    NRBCs per 100 WBC 0 <1 /100    Absolute Neutrophils 2.5 1.6 - 8.3 10e3/uL    Absolute Lymphocytes 1.2 0.8 - 5.3 10e3/uL    Absolute Monocytes 0.5 0.0 - 1.3 10e3/uL    Absolute Eosinophils 0.2 0.0 - 0.7 10e3/uL    Absolute Basophils 0.0 0.0 - 0.2 10e3/uL    Absolute Immature Granulocytes 0.0 <=0.4 10e3/uL    Absolute NRBCs 0.0 10e3/uL   iStat Gases (lactate) venous, POCT     Status: Abnormal   Result Value Ref Range    Lactic Acid POCT 2.3 (H) <=2.0 mmol/L    Bicarbonate Venous POCT 26 21 - 28 mmol/L    O2 Sat, Venous POCT 93 (L) 94 - 100 %    pCO2V Venous POCT 43 40 - 50 mm Hg    pH Venous POCT 7.40 7.32 - 7.43    pO2 Venous POCT 69 (H) 25 - 47 mm Hg   iStat Gases (lactate) venous, POCT     Status: Abnormal   Result Value Ref Range    Lactic Acid POCT 2.7 (H) <=2.0 mmol/L    Bicarbonate Venous POCT 27 21 - 28 mmol/L    O2 Sat, Venous POCT 98 94 - 100 %    pCO2V Venous POCT 45 40 - 50 mm Hg    pH Venous POCT 7.38 7.32 - 7.43    pO2 Venous POCT 99 (H) 25 - 47 mm Hg   CBC with platelets differential     Status: None    Narrative    The following orders were created for panel order CBC with platelets differential.  Procedure                               Abnormality         Status                     ---------                               -----------          ------                     CBC with platelets and d...[[id number]]                      Final result                 Please view results for these tests on the individual orders.     Medications   0.9% sodium chloride BOLUS (has no administration in time range)     Labs Ordered and Resulted from Time of ED Arrival to Time of ED Departure   ISTAT GASES LACTATE VENOUS POCT - Abnormal       Result Value    Lactic Acid POCT 2.3 (*)     Bicarbonate Venous POCT 26      O2 Sat, Venous POCT 93 (*)     pCO2V Venous POCT 43      pH Venous POCT 7.40      pO2 Venous POCT 69 (*)    ISTAT GASES LACTATE VENOUS POCT - Abnormal    Lactic Acid POCT 2.7 (*)     Bicarbonate Venous POCT 27      O2 Sat, Venous POCT 98      pCO2V Venous POCT 45      pH Venous POCT 7.38      pO2 Venous POCT 99 (*)    CRP INFLAMMATION - Normal    CRP Inflammation <3.00     ERYTHROCYTE SEDIMENTATION RATE AUTO - Normal    Erythrocyte Sedimentation Rate 8     PROCALCITONIN - Normal    Procalcitonin 0.03     CBC WITH PLATELETS AND DIFFERENTIAL    WBC Count 4.4      RBC Count 5.08      Hemoglobin 14.2      Hematocrit 41.5      MCV 82      MCH 28.0      MCHC 34.2      RDW 13.8      Platelet Count 329      % Neutrophils 56      % Lymphocytes 28      % Monocytes 10      % Eosinophils 5      % Basophils 1      % Immature Granulocytes 0      NRBCs per 100 WBC 0      Absolute Neutrophils 2.5      Absolute Lymphocytes 1.2      Absolute Monocytes 0.5      Absolute Eosinophils 0.2      Absolute Basophils 0.0      Absolute Immature Granulocytes 0.0      Absolute NRBCs 0.0     COMPREHENSIVE METABOLIC PANEL     No orders to display          Critical care was not performed.     Medical Decision Making  The patient's presentation was of moderate complexity (a chronic illness mild to moderate exacerbation, progression, or side effect of treatment).    The patient's evaluation involved:  review of external note(s) from 3+ sources (see separate area of note for  details)  ordering and/or review of 3+ test(s) in this encounter (see separate area of note for details)    The patient's management necessitated moderate risk (prescription drug management including medications given in the ED).      Assessment & Plan    Wounds care after frostbites-healing well with normal WBC, CRP and ESR, local care done with bacitracin, follow up with Primary Care.    Mildly elevated lactic acid probably due to dehydration, 2 litter and recheck. Plan to discharge after.    I have reviewed the nursing notes. I have reviewed the findings, diagnosis, plan and need for follow up with the patient.    New Prescriptions    No medications on file       Final diagnoses:   Open wound   Frostbite, sequela   Homeless   Dehydration   I, Nydia Burnette, am serving as a trained medical scribe to document services personally performed by Rosenda Loyola Md, based on the provider's statements to me.     IRosenda Md, was physically present and have reviewed and verified the accuracy of this note documented by Nydia Burnette.      Rosenda Loyola MD  [facility] EMERGENCY DEPARTMENT  3/24/2023     Rosenda Loyola MD  03/25/23 0031

## 2023-03-25 NOTE — ED NOTES
"While getting labs from the pt. Pt stated that the writer should use his left arm where he injects drugs into that arm ( that being the left arm). Pt stated,\" after I discharge I will being using drugs on this arm.\"  "

## 2023-03-30 ENCOUNTER — HOSPITAL ENCOUNTER (EMERGENCY)
Facility: CLINIC | Age: 28
Discharge: HOME | End: 2023-03-31
Attending: EMERGENCY MEDICINE | Admitting: EMERGENCY MEDICINE
Payer: MEDICAID

## 2023-03-30 DIAGNOSIS — R50.2 DRUG-INDUCED FEVER: ICD-10-CM

## 2023-03-30 DIAGNOSIS — F15.20 AMPHETAMINE USE DISORDER, SEVERE, DEPENDENCE (H): ICD-10-CM

## 2023-03-30 PROCEDURE — 96361 HYDRATE IV INFUSION ADD-ON: CPT | Performed by: EMERGENCY MEDICINE

## 2023-03-30 PROCEDURE — 96365 THER/PROPH/DIAG IV INF INIT: CPT | Performed by: EMERGENCY MEDICINE

## 2023-03-30 PROCEDURE — 99291 CRITICAL CARE FIRST HOUR: CPT | Performed by: EMERGENCY MEDICINE

## 2023-03-30 PROCEDURE — 99285 EMERGENCY DEPT VISIT HI MDM: CPT | Mod: 25 | Performed by: EMERGENCY MEDICINE

## 2023-03-30 PROCEDURE — 96375 TX/PRO/DX INJ NEW DRUG ADDON: CPT | Performed by: EMERGENCY MEDICINE

## 2023-03-31 ENCOUNTER — APPOINTMENT (OUTPATIENT)
Dept: GENERAL RADIOLOGY | Facility: CLINIC | Age: 28
End: 2023-03-31
Attending: EMERGENCY MEDICINE
Payer: MEDICAID

## 2023-03-31 VITALS
DIASTOLIC BLOOD PRESSURE: 67 MMHG | HEART RATE: 88 BPM | RESPIRATION RATE: 16 BRPM | WEIGHT: 130 LBS | OXYGEN SATURATION: 100 % | BODY MASS INDEX: 15.35 KG/M2 | HEIGHT: 77 IN | SYSTOLIC BLOOD PRESSURE: 114 MMHG | TEMPERATURE: 97.5 F

## 2023-03-31 LAB
ALBUMIN SERPL BCG-MCNC: 3.9 G/DL (ref 3.5–5.2)
ALBUMIN UR-MCNC: NEGATIVE MG/DL
ALP SERPL-CCNC: 88 U/L (ref 40–129)
ALT SERPL W P-5'-P-CCNC: 115 U/L (ref 10–50)
AMPHETAMINES UR QL SCN: ABNORMAL
ANION GAP SERPL CALCULATED.3IONS-SCNC: 10 MMOL/L (ref 7–15)
APPEARANCE UR: CLEAR
AST SERPL W P-5'-P-CCNC: 124 U/L (ref 10–50)
BARBITURATES UR QL SCN: ABNORMAL
BASOPHILS # BLD AUTO: 0 10E3/UL (ref 0–0.2)
BASOPHILS NFR BLD AUTO: 0 %
BENZODIAZ UR QL SCN: ABNORMAL
BILIRUB SERPL-MCNC: 0.6 MG/DL
BILIRUB UR QL STRIP: NEGATIVE
BUN SERPL-MCNC: 7.1 MG/DL (ref 6–20)
BZE UR QL SCN: ABNORMAL
CALCIUM SERPL-MCNC: 9 MG/DL (ref 8.6–10)
CANNABINOIDS UR QL SCN: ABNORMAL
CHLORIDE SERPL-SCNC: 98 MMOL/L (ref 98–107)
COLOR UR AUTO: NORMAL
CREAT SERPL-MCNC: 0.69 MG/DL (ref 0.67–1.17)
DEPRECATED HCO3 PLAS-SCNC: 26 MMOL/L (ref 22–29)
EOSINOPHIL # BLD AUTO: 0 10E3/UL (ref 0–0.7)
EOSINOPHIL NFR BLD AUTO: 0 %
ERYTHROCYTE [DISTWIDTH] IN BLOOD BY AUTOMATED COUNT: 14.1 % (ref 10–15)
FLUAV RNA SPEC QL NAA+PROBE: NEGATIVE
FLUBV RNA RESP QL NAA+PROBE: NEGATIVE
GFR SERPL CREATININE-BSD FRML MDRD: >90 ML/MIN/1.73M2
GLUCOSE SERPL-MCNC: 115 MG/DL (ref 70–99)
GLUCOSE UR STRIP-MCNC: NEGATIVE MG/DL
HCO3 BLDV-SCNC: 28 MMOL/L (ref 21–28)
HCT VFR BLD AUTO: 41.6 % (ref 40–53)
HGB BLD-MCNC: 14.1 G/DL (ref 13.3–17.7)
HGB UR QL STRIP: NEGATIVE
IMM GRANULOCYTES # BLD: 0 10E3/UL
IMM GRANULOCYTES NFR BLD: 0 %
KETONES UR STRIP-MCNC: NEGATIVE MG/DL
LACTATE BLD-SCNC: 0.8 MMOL/L
LEUKOCYTE ESTERASE UR QL STRIP: NEGATIVE
LIPASE SERPL-CCNC: 17 U/L (ref 13–60)
LYMPHOCYTES # BLD AUTO: 0.9 10E3/UL (ref 0.8–5.3)
LYMPHOCYTES NFR BLD AUTO: 12 %
MCH RBC QN AUTO: 27.7 PG (ref 26.5–33)
MCHC RBC AUTO-ENTMCNC: 33.9 G/DL (ref 31.5–36.5)
MCV RBC AUTO: 82 FL (ref 78–100)
MONOCYTES # BLD AUTO: 0.9 10E3/UL (ref 0–1.3)
MONOCYTES NFR BLD AUTO: 12 %
NEUTROPHILS # BLD AUTO: 5.6 10E3/UL (ref 1.6–8.3)
NEUTROPHILS NFR BLD AUTO: 76 %
NITRATE UR QL: NEGATIVE
NRBC # BLD AUTO: 0 10E3/UL
NRBC BLD AUTO-RTO: 0 /100
OPIATES UR QL SCN: ABNORMAL
PCO2 BLDV: 40 MM HG (ref 40–50)
PH BLDV: 7.45 [PH] (ref 7.32–7.43)
PH UR STRIP: 7 [PH] (ref 5–7)
PLATELET # BLD AUTO: 192 10E3/UL (ref 150–450)
PO2 BLDV: 68 MM HG (ref 25–47)
POTASSIUM SERPL-SCNC: 4.1 MMOL/L (ref 3.4–5.3)
PROCALCITONIN SERPL IA-MCNC: 0.18 NG/ML
PROT SERPL-MCNC: 7.2 G/DL (ref 6.4–8.3)
RBC # BLD AUTO: 5.09 10E6/UL (ref 4.4–5.9)
RBC URINE: <1 /HPF
RSV RNA SPEC NAA+PROBE: NEGATIVE
SAO2 % BLDV: 94 % (ref 94–100)
SARS-COV-2 RNA RESP QL NAA+PROBE: NEGATIVE
SODIUM SERPL-SCNC: 134 MMOL/L (ref 136–145)
SP GR UR STRIP: 1.01 (ref 1–1.03)
SQUAMOUS EPITHELIAL: <1 /HPF
UROBILINOGEN UR STRIP-MCNC: NORMAL MG/DL
WBC # BLD AUTO: 7.4 10E3/UL (ref 4–11)
WBC URINE: 1 /HPF

## 2023-03-31 PROCEDURE — 81001 URINALYSIS AUTO W/SCOPE: CPT | Performed by: EMERGENCY MEDICINE

## 2023-03-31 PROCEDURE — 36415 COLL VENOUS BLD VENIPUNCTURE: CPT | Performed by: EMERGENCY MEDICINE

## 2023-03-31 PROCEDURE — 87086 URINE CULTURE/COLONY COUNT: CPT | Performed by: EMERGENCY MEDICINE

## 2023-03-31 PROCEDURE — 87637 SARSCOV2&INF A&B&RSV AMP PRB: CPT | Performed by: EMERGENCY MEDICINE

## 2023-03-31 PROCEDURE — 80053 COMPREHEN METABOLIC PANEL: CPT | Performed by: EMERGENCY MEDICINE

## 2023-03-31 PROCEDURE — 87040 BLOOD CULTURE FOR BACTERIA: CPT | Performed by: EMERGENCY MEDICINE

## 2023-03-31 PROCEDURE — 85025 COMPLETE CBC W/AUTO DIFF WBC: CPT | Performed by: EMERGENCY MEDICINE

## 2023-03-31 PROCEDURE — 80307 DRUG TEST PRSMV CHEM ANLYZR: CPT | Performed by: EMERGENCY MEDICINE

## 2023-03-31 PROCEDURE — 84145 PROCALCITONIN (PCT): CPT | Performed by: EMERGENCY MEDICINE

## 2023-03-31 PROCEDURE — 83690 ASSAY OF LIPASE: CPT | Performed by: EMERGENCY MEDICINE

## 2023-03-31 PROCEDURE — 83605 ASSAY OF LACTIC ACID: CPT

## 2023-03-31 PROCEDURE — 250N000011 HC RX IP 250 OP 636: Performed by: EMERGENCY MEDICINE

## 2023-03-31 PROCEDURE — 250N000013 HC RX MED GY IP 250 OP 250 PS 637: Performed by: EMERGENCY MEDICINE

## 2023-03-31 PROCEDURE — 71045 X-RAY EXAM CHEST 1 VIEW: CPT

## 2023-03-31 PROCEDURE — 258N000003 HC RX IP 258 OP 636: Performed by: EMERGENCY MEDICINE

## 2023-03-31 PROCEDURE — 73070 X-RAY EXAM OF ELBOW: CPT | Mod: RT

## 2023-03-31 RX ORDER — ACETAMINOPHEN 325 MG/1
650 TABLET ORAL EVERY 6 HOURS PRN
Status: DISCONTINUED | OUTPATIENT
Start: 2023-03-31 | End: 2023-03-31 | Stop reason: HOSPADM

## 2023-03-31 RX ORDER — KETOROLAC TROMETHAMINE 15 MG/ML
15 INJECTION, SOLUTION INTRAMUSCULAR; INTRAVENOUS ONCE
Status: COMPLETED | OUTPATIENT
Start: 2023-03-31 | End: 2023-03-31

## 2023-03-31 RX ORDER — PIPERACILLIN SODIUM, TAZOBACTAM SODIUM 4; .5 G/20ML; G/20ML
4.5 INJECTION, POWDER, LYOPHILIZED, FOR SOLUTION INTRAVENOUS ONCE
Status: COMPLETED | OUTPATIENT
Start: 2023-03-31 | End: 2023-03-31

## 2023-03-31 RX ORDER — METOCLOPRAMIDE HYDROCHLORIDE 5 MG/ML
10 INJECTION INTRAMUSCULAR; INTRAVENOUS ONCE
Status: COMPLETED | OUTPATIENT
Start: 2023-03-31 | End: 2023-03-31

## 2023-03-31 RX ADMIN — PIPERACILLIN SODIUM AND TAZOBACTAM SODIUM 4.5 G: 4; .5 INJECTION, POWDER, LYOPHILIZED, FOR SOLUTION INTRAVENOUS at 01:30

## 2023-03-31 RX ADMIN — KETOROLAC TROMETHAMINE 15 MG: 15 INJECTION, SOLUTION INTRAMUSCULAR; INTRAVENOUS at 01:03

## 2023-03-31 RX ADMIN — METOCLOPRAMIDE HYDROCHLORIDE 10 MG: 5 INJECTION INTRAMUSCULAR; INTRAVENOUS at 01:03

## 2023-03-31 RX ADMIN — ACETAMINOPHEN 650 MG: 325 TABLET ORAL at 01:52

## 2023-03-31 RX ADMIN — SODIUM CHLORIDE 2000 ML: 9 INJECTION, SOLUTION INTRAVENOUS at 01:04

## 2023-03-31 ASSESSMENT — ACTIVITIES OF DAILY LIVING (ADL)
ADLS_ACUITY_SCORE: 35

## 2023-03-31 NOTE — ED PROVIDER NOTES
"  History     Chief Complaint   Patient presents with     Generalized Body Aches     HPI  Krystal Kern is a 28 year old male without PMH who presents to the ED with fever and body aches.  Patient is a difficult historian.  He states that symptoms got significantly worse this past morning.  He reports having symptoms for about 2 weeks which have been worsening.  He endorses fevers and headache.  Also having pain in his right arm.  He notes two wounds near his right elbow.  He denies vomiting, denies diarrhea, denies cough.  Patient reports desire to sleep here tonight and be assessed in the morning    Past Medical History  History reviewed. No pertinent past medical history.  No past surgical history on file.  No current outpatient medications on file.    No Known Allergies  Family History  No family history on file.  Social History   Social History     Tobacco Use     Smoking status: Every Day     Packs/day: 1.00     Types: Cigarettes     Smokeless tobacco: Never   Substance Use Topics     Alcohol use: Never     Drug use: Never         A medically appropriate review of systems was performed with pertinent positives and negatives noted in the HPI, and all other systems negative.    Physical Exam   BP: 101/68  Pulse: (!) 129  Temp: (!) 101  F (38.3  C)  Resp: 20  Height: 195.6 cm (6' 5\")  Weight: 59 kg (130 lb)  SpO2: 100 %    Physical Exam  General: Uncomfortable appearing. Appears stated age.   HENT: MMM, no oropharyngeal lesions  Eyes: PERRL, normal sclerae  Neck: non-tender, supple, full ROM  Cardio: Tachycardic rate. Regular rhythm. Extremities well perfused  Resp: Normal work of breathing, normal respiratory rate.  Chest/Back: no visual signs of trauma, no CVA tenderness  Abdomen: no tenderness, non-distended, no rebound, no guarding  Neuro: drowsy, rouses to voice. Mumbled brief responses. Seems somewhat confused.  CN II-XII grossly intact. Grossly normal strength and sensation in all extremities.   MSK: no " deformities. Grossly normal ROM in extremities.   Integumentary/Skin: Right arm with scabbed over 3 cm diameter abrasion on the medial aspect of the right elbow, skin lesion with appearance of fairly fresh abrasion with underlying pink tissue on the lateral aspect of the right elbow, surrounding tenderness without significant erythema nor warmth.      ED Course      Procedures         Critical Care Addendum  My initial assessment, based on my review of vital signs, focused history and physical exam, established a high suspicion that Krystal Kern has sepsis with indication for early goal-directed therapy, which requires immediate intervention, and therefore he is critically ill.     After the initial assessment, the care team initiated multiple lab tests, initiated IV fluid administration and initiated medication therapy with broad spectrum antibiotics to provide stabilization care. Due to the critical nature of this patient, I reassessed nursing observations, vital signs, physical exam, review of cardiac rhythm monitor and interpretation of imaging studies multiple times prior to his disposition.     Time also spent performing documentation, reviewing test results and coordination of care.     Critical care time (excluding teaching time and procedures): 44 minutes.        Labs Ordered and Resulted from Time of ED Arrival to Time of ED Departure   COMPREHENSIVE METABOLIC PANEL - Abnormal       Result Value    Sodium 134 (*)     Potassium 4.1      Chloride 98      Carbon Dioxide (CO2) 26      Anion Gap 10      Urea Nitrogen 7.1      Creatinine 0.69      Calcium 9.0      Glucose 115 (*)     Alkaline Phosphatase 88       (*)      (*)     Protein Total 7.2      Albumin 3.9      Bilirubin Total 0.6      GFR Estimate >90     PROCALCITONIN - Abnormal    Procalcitonin 0.18 (*)    ISTAT GASES LACTATE VENOUS POCT - Abnormal    Lactic Acid POCT 0.8      Bicarbonate Venous POCT 28      O2 Sat, Venous POCT 94       pCO2V Venous POCT 40      pH Venous POCT 7.45 (*)     pO2 Venous POCT 68 (*)    DRUG ABUSE SCREEN 1 URINE (ED) - Abnormal    Amphetamines Urine Screen Positive (*)     Barbituates Urine Screen Negative      Benzodiazepine Urine Screen Negative      Cannabinoids Urine Screen Negative      Cocaine Urine Screen Negative      Opiates Urine Screen Negative     LIPASE - Normal    Lipase 17     ROUTINE UA WITH MICROSCOPIC - Normal    Color Urine Light Yellow      Appearance Urine Clear      Glucose Urine Negative      Bilirubin Urine Negative      Ketones Urine Negative      Specific Gravity Urine 1.013      Blood Urine Negative      pH Urine 7.0      Protein Albumin Urine Negative      Urobilinogen Urine Normal      Nitrite Urine Negative      Leukocyte Esterase Urine Negative      RBC Urine <1      WBC Urine 1      Squamous Epithelials Urine <1     INFLUENZA A/B, RSV, & SARS-COV2 PCR - Normal    Influenza A PCR Negative      Influenza B PCR Negative      RSV PCR Negative      SARS CoV2 PCR Negative     CBC WITH PLATELETS AND DIFFERENTIAL    WBC Count 7.4      RBC Count 5.09      Hemoglobin 14.1      Hematocrit 41.6      MCV 82      MCH 27.7      MCHC 33.9      RDW 14.1      Platelet Count 192      % Neutrophils 76      % Lymphocytes 12      % Monocytes 12      % Eosinophils 0      % Basophils 0      % Immature Granulocytes 0      NRBCs per 100 WBC 0      Absolute Neutrophils 5.6      Absolute Lymphocytes 0.9      Absolute Monocytes 0.9      Absolute Eosinophils 0.0      Absolute Basophils 0.0      Absolute Immature Granulocytes 0.0      Absolute NRBCs 0.0     URINE CULTURE   BLOOD CULTURE   BLOOD CULTURE     Elbow XR, 2 views, right   Final Result   IMPRESSION: Medial soft tissue swelling about the right elbow. No underlying displaced fracture or joint effusion.      XR Chest 1 View   Final Result   IMPRESSION: Heart size is normal. Subtle streaky opacities in the left perihilar and right basilar regions could reflect  an early infiltrate given history of fever. No pneumothorax or pleural effusion.                 Assessments & Plan (with Medical Decision Making)   Patient presenting with fever. Vitals in the ED notable for initial temp 38.3 C, initial heart rate in 120s. Nursing notes reviewed. Initial differential diagnosis includes but not limited to sepsis, fever, UTI, pneumonia, substance-induced fever.     High initial suspicion for sepsis given presence of fever and tachycardia.  Early goal-directed therapy undertaken with blood cultures being drawn and broad-spectrum antibiotics started with Zosyn.  Labs overall more reassuring than was expected.  WBC within normal limits at 7.4.  Lactate within normal limits at 0.8.  Procalcitonin in low risk range at 0.18.  Chest x-ray with very subtle opacities felt to be unlikely pneumonia.  Abrasions are present near the right elbow but the area is without signs of cellulitis.  UA without evidence of UTI.    In the ED, the patient's symptoms were managed with ketorolac and acetaminophen, with improvement in symptoms upon reassessment.     Patient monitoring clinically overnight.  Vitals completely normalized.  In the morning, the patient was more interactive and able to provide further history.  He endorses methamphetamine use.  Amphetamine was positive on UDS as well.    The complete clinical picture is most consistent with drug-induced fever secondary to amphetamine use. After counseling on the diagnosis, work-up, and treatment plan, the patient was discharged. The patient was advised to follow-up with primary care in few days. The patient was advised to return to the ED if worsening symptoms, fever or any urgent health concerns.     Final diagnoses:   Amphetamine use disorder, severe, dependence (H)   Drug-induced fever     New Prescriptions    No medications on file     --  Sumeet Escobedo MD   Emergency Medicine   [facility] EMERGENCY DEPARTMENT  3/30/2023      Sumeet Escobedo MD  03/31/23 0801

## 2023-03-31 NOTE — ED NOTES
Patient refusing blood draw, when asked why, he states he's going to request another doctor in the morning, pt educated that regardless of the Dr. They will order the same tests. Pt continues to decline. Another RN sent in to attempt lab draws and IV access.

## 2023-03-31 NOTE — PHARMACY-VANCOMYCIN DOSING SERVICE
Pharmacy Vancomycin Initial Note  Date of Service 2023  Patient's  1995  28 year old, male    Indication: Sepsis    Current estimated CrCl = Estimated Creatinine Clearance: 134.2 mL/min (based on SCr of 0.69 mg/dL).    Creatinine for last 3 days  3/31/2023:  1:13 AM Creatinine 0.69 mg/dL    Recent Vancomycin Level(s) for last 3 days  No results found for requested labs within last 72 hours.      Vancomycin IV Administrations (past 72 hours)      No vancomycin orders with administrations in past 72 hours.                Nephrotoxins and other renal medications (From now, onward)    Start     Dose/Rate Route Frequency Ordered Stop    23 0610  vancomycin (VANCOCIN) 1,500 mg in 0.9% NaCl 250 mL intermittent infusion         1,500 mg  over 90 Minutes Intravenous ONCE 23 0606            Contrast Orders - past 72 hours (72h ago, onward)    None          Insight[employer] Prediction of Planned Initial Vancomycin Regimen    Loading dose: 15 mg at 06:30 2023.  Regimen: 1250 mg IV every 12 hours.  Start time: 06:10 on 2023  Exposure target: AUC24 (range)400-600 mg/L.hr   AUC24,ss: 505 mg/L.hr  Probability of AUC24 > 400: 73 %  Ctrough,ss: 13.7 mg/L  Probability of Ctrough,ss > 20: 24 %  Probability of nephrotoxicity (Lodise ALEJO ): 9 %        Plan:  1. Start vancomycin  1500 mg IV x1, followed by vancomycin 1250 mg IV q12h.   2. Vancomycin monitoring method: AUC  3. Vancomycin therapeutic monitoring goal: 400-600 mg*h/L  4. Pharmacy will check vancomycin levels as appropriate in 1-3 Days.    5. Serum creatinine levels will be ordered daily for the first week of therapy and at least twice weekly for subsequent weeks.      [place] [facility] [facility]

## 2023-03-31 NOTE — DISCHARGE INSTRUCTIONS
Please use the below resources and your primary care physician to safely cease alcohol and/or substance use.     Return to the ED if you are having fever (temperature > 100.4 F), or any other urgent health concerns.     DISCHARGE RESOURCES:  -[facility]: [phone] (detox), [phone] (outpatient & [facility])  -[facility] ([address]): [phone]  -[facility]: [phone]  -[facility]: [phone]  -[facility] - self management for addiction recovery:  www.[employer]sy.org    -[facility]: [phone].  -[facility] [phone]   -Substance Abuse and Mental Health Services (www.samhsa.gov)  -Harm Reduction Coalition (www. Harmreduction.org)  -[place] Opioid Prevention Coalition: www.opioidcoalition.org  -Poison control 1-431.713.2955       Sober Support Group Information:  AA/NA & Sponsor/Support  -Alcoholics Anonymous (www.alcoholics-anonymous.org): for local information 24 hours/day  -AA Intergroup service office in [place] (http://www.aastpaul.org/) [phone]  -AA Intergroup service office in [facility]: [phone]. (http://www.a[place].org/)  -Narcotics Anonymous (www.nam[place].org) (319) [phone]   -Sober Fun Activities: www.sober-activities.[employer]/[facility]//[facility] ([facility])  [facility] connects people seeking recovery to resources that help foster and sustain long-term recovery.  Whether you are seeking resources for treatment, transportation, housing, job training, education, health care or other pathways to recovery, [facility] is a great place to start.   Phone: [phone]. www.[employer] (Great listing of all types of recovery and non-recovery related resources)

## 2023-03-31 NOTE — ED NOTES
"Writer went in to discharge pt, IV discontinued and removed with no issue, writer then told pt he has been discharged and was free to leave after dressing up. Pt got agitated and said he is still sick and not leaving ' I ordered breakfast am staying right here am very sick\"  Writer offered to give pt sandwiches and juice or milk but pt refused and continued to say he is not leaving.  Writer informed charge nurse and security was called.  "

## 2023-03-31 NOTE — ED TRIAGE NOTES
Patient c/o generalized body ache. Patient said pain started 10 days ago. He also c/o of pain to right arm. He said it started 3 to 4 nights ago. Wound noted to right elbow. Arm was also swollen. Scabbing wound noted to left second finger. Left hand was also swollen. Patient said he was not taking medication at all.     Triage Assessment     Row Name 03/30/23 8299       Triage Assessment (Adult)    Airway WDL WDL       Respiratory WDL    Respiratory WDL WDL       Skin Circulation/Temperature WDL    Skin Circulation/Temperature WDL X  wound to right elbow, right arm swelling, left hand swelling       Cognitive/Neuro/Behavioral WDL    Cognitive/Neuro/Behavioral WDL all    Level of Consciousness alert    Arousal Level opens eyes spontaneously    Orientation disoriented to;place;time    Speech spontaneous    Mood/Behavior cooperative;anxious

## 2023-04-01 LAB — BACTERIA UR CULT: NO GROWTH

## 2023-04-05 LAB
BACTERIA BLD CULT: NO GROWTH
BACTERIA BLD CULT: NO GROWTH

## 2023-05-17 ENCOUNTER — TRANSFERRED RECORDS (OUTPATIENT)
Dept: HEALTH INFORMATION MANAGEMENT | Facility: CLINIC | Age: 28
End: 2023-05-17
Payer: MEDICAID

## 2023-05-31 ENCOUNTER — TELEPHONE (OUTPATIENT)
Dept: ADDICTION MEDICINE | Facility: CLINIC | Age: 28
End: 2023-05-31

## 2023-05-31 NOTE — TELEPHONE ENCOUNTER
Writer spoke with pt's  and scheduled an admission for 12 pm on 6/1.    Kirsten Powell, , called back and stated due to pt having warrants in another county and the need for deputy transportation, pt's admission will have to be next week sometime.

## 2025-07-29 NOTE — CONSULTS
Correct serum vials verified by patient and nurse. After obtaining consent, and per orders of Dr Last, injections of allergy serum given by ELISHA Marquez. Patient instructed to remain in clinic for 30 minutes after injection, and to report any adverse reactions to me immediately. No change in patient status post injection.   [facility]  ORTHOPAEDIC SURGERY CONSULT - HISTORY AND PHYSICAL    DATE OF CONSULT: 1/13/2023 1:56 PM    REQUESTING PROVIDER: Yadiel Georges DO, MD - N Staff.    CC: Left arm pain    HISTORY OF PRESENT ILLNESS:   Navi Kern is a 28 year old male with pmhx of injection drug use who previously presented to an outside hospital on 12/23/2022 and underwent an I&D of the left elbow on 12/23 and repeat I&D on 12/28 and was started on Unasyn.  The patient left AMA without antibiotics on 1/3/2023.  The patient presented today with persistent left arm pain.  The patient states that he feels pain diffusely in the entire arm, but is also intermittently interactive in the exam due to falling asleep.  Per other providers and chart notes the patient has been minimally cooperative with exams since presentation.  When asked to move his arm and elbow the patient is able to do this without much apparent difficulty.  He does not endorse any persistent drainage from the prior incision site.  Nor does he endorse any systemic symptoms such as fevers or chills.  He states he has maintained motor and sensory function of the left upper extremity.    Denies numbness, tingling, or weakness to the affected extremities.  Denies fevers, chills, nausea, vomiting, diarrhea, constipation, chest pain, shortness of breath.    PAST MEDICAL HISTORY:   No past medical history on file.  [Patient denies any personal history of bleeding disorders, clotting disorders, or adverse reactions to anesthesia].    PAST SURGICAL HISTORY:   Left elbow I&D 12/23/22 and 12/28/22   No past surgical history on file.    MEDICATIONS:   Prior to Admission medications    Not on File       ALLERGIES:   Patient has no known allergies.    SOCIAL HISTORY:   Social History     Socioeconomic History     Marital status: Single     Spouse name: Not on file     Number of children: Not on file     Years of education: Not on file     Highest education level:  Not on file   Occupational History     Not on file   Tobacco Use     Smoking status: Every Day     Types: Cigarettes     Smokeless tobacco: Never   Substance and Sexual Activity     Alcohol use: Not Currently     Drug use: Yes     Comment: crystal meth     Sexual activity: Not on file   Other Topics Concern     Not on file   Social History Narrative     Not on file     Social Determinants of Health     Financial Resource Strain: Not on file   Food Insecurity: Not on file   Transportation Needs: Not on file   Physical Activity: Not on file   Stress: Not on file   Social Connections: Not on file   Intimate Partner Violence: Not on file   Housing Stability: Not on file     Living situation: homeless  Illicit Drugs: injectables  Unable to obtain other social history due to lack of cooperation with exam.    FAMILY HISTORY:  No family history on file.    Patient denies known family history of bleeding, clotting, or anesthesia related complications.     REVIEW OF SYSTEMS:   10-point reviews of systems was negative except as noted above in the HPI.     PHYSICAL EXAM:   Vitals:    01/12/23 2240 01/13/23 0809   BP: 130/80 97/59   BP Location:  Right arm   Pulse: 109 94   Resp: 16 17   Temp: (!) 101.5  F (38.6  C) 98.7  F (37.1  C)   TempSrc: Oral Oral   SpO2: 100% 98%   Weight: 65.8 kg (145 lb)      General: Awake, alert, appropriate, following commands, NAD.  Lungs: Breathing comfortably and nonlabored, no wheezes or stridor noted.  Heart/Cardiovascular: Regular pulse, no peripheral cyanosis.  Right Upper Extremity: No deformity, skin intact. No significant tenderness to palpation over clavicle, AC joint, shoulder, arm, elbow, forearm, wrist. Normal ROM shoulder, elbow, wrist without pain. Motor intact distally with finger flexion/extension/intrinsics/EPL, OK sign 5/5 strength. SILT ax/m/r/u nerve distributions. Radial pulse palpable, 2+.   Left Upper Extremity: Prior incision site noted at medial left elbow with retained  sutures.  The incision appears to be healing well without any purulent, bloody, or serous drainage.  There is no erythema, fluctuance, or swelling about the left elbow.  There is a small prominence over the dorsum of the left hand which is minimally tender to palpation but does not have overlying erythema or any appreciable fluctuance on exam.  The left elbow is minimally tender to palpation along the prior incision site, but is nontender along the lateral or posterior aspect of the elbow.  No significant tenderness to palpation over clavicle, AC joint, shoulder, arm, elbow, forearm, wrist.  Full active and passive range of motion of the left elbow was present.  Normal ROM shoulder, elbow, wrist without pain. Motor intact distally with finger flexion/extension/intrinsics/EPL, OK sign 5/5 strength. SILT ax/m/r/u nerve distributions. Radial pulse palpable, 2+.     LABS:  Hemoglobin   Date Value Ref Range Status   01/12/2023 11.0 (L) 13.3 - 17.7 g/dL Final     WBC Count   Date Value Ref Range Status   01/12/2023 7.8 4.0 - 11.0 10e3/uL Final     Platelet Count   Date Value Ref Range Status   01/12/2023 300 150 - 450 10e3/uL Final     No results found for: INR  Creatinine   Date Value Ref Range Status   01/12/2023 0.88 0.66 - 1.25 mg/dL Final     Glucose   Date Value Ref Range Status   01/12/2023 99 70 - 99 mg/dL Final     CRP Inflammation   Date Value Ref Range Status   01/13/2023 64.0 (H) 0.0 - 8.0 mg/L Final     Erythrocyte Sedimentation Rate   Date Value Ref Range Status   01/13/2023 18 (H) 0 - 15 mm/hr Final         IMAGING:  XR and US of left upper extremity pending    IMPRESSION:   Navi Kern is a 28 year old male w/ PMHx of injection drug use and prior left elbow I&D 2 weeks ago without completion of prior antibiotic regimen who presents with diffuse left upper extremity pain.  Exam findings of the left elbow are reassuring without any suggestion of septic arthritis at this time.  There is no indication  for aspiration of the joint at this time.  We would recommend symptomatic pain control, completion of antibiotic regimen from prior admission at outside hospital or per infectious disease recommendations, work-up for other possible infectious sources, suture removal, and monitoring for possible amphetamine withdrawal.    RECOMMENDATIONS:   -Medicine primary  - Anticoagulation/DVT Prophylaxis: Per primary  - Antibiotics/Tetanus: Per primary/ID  - X-rays/Imaging: Left elbow pending  - Activity: As tolerated from orthopedic perspective  - Weight bearing: WBAT  - Pain control: Multimodal  - Diet: Regular from orthopedics perspective  - Disposition: Medicine floor for further antibiosis and pain management    Orthopedics to sign off at this time, please reach out with any new questions or concerns    Assessment and Plan discussed with resident Dr. Bravo Shelton and Kulwinder Fuentes MD  PGY-1  Orthopaedic Surgery